# Patient Record
Sex: FEMALE | Race: WHITE | NOT HISPANIC OR LATINO | Employment: UNEMPLOYED | URBAN - METROPOLITAN AREA
[De-identification: names, ages, dates, MRNs, and addresses within clinical notes are randomized per-mention and may not be internally consistent; named-entity substitution may affect disease eponyms.]

---

## 2019-06-27 ENCOUNTER — HOSPITAL ENCOUNTER (OUTPATIENT)
Facility: MEDICAL CENTER | Age: 49
End: 2019-06-28
Attending: EMERGENCY MEDICINE | Admitting: INTERNAL MEDICINE
Payer: MEDICAID

## 2019-06-27 ENCOUNTER — APPOINTMENT (OUTPATIENT)
Dept: RADIOLOGY | Facility: MEDICAL CENTER | Age: 49
End: 2019-06-27
Attending: EMERGENCY MEDICINE
Payer: MEDICAID

## 2019-06-27 DIAGNOSIS — S81.812A LACERATION OF LEFT LOWER EXTREMITY, INITIAL ENCOUNTER: ICD-10-CM

## 2019-06-27 DIAGNOSIS — Z98.890 STATUS POST INCISION AND DRAINAGE: ICD-10-CM

## 2019-06-27 LAB
ALBUMIN SERPL BCP-MCNC: 4.1 G/DL (ref 3.2–4.9)
ALBUMIN/GLOB SERPL: 1.7 G/DL
ALP SERPL-CCNC: 105 U/L (ref 30–99)
ALT SERPL-CCNC: 11 U/L (ref 2–50)
ANION GAP SERPL CALC-SCNC: 10 MMOL/L (ref 0–11.9)
AST SERPL-CCNC: 13 U/L (ref 12–45)
BASOPHILS # BLD AUTO: 1.2 % (ref 0–1.8)
BASOPHILS # BLD: 0.08 K/UL (ref 0–0.12)
BILIRUB SERPL-MCNC: 0.5 MG/DL (ref 0.1–1.5)
BUN SERPL-MCNC: 17 MG/DL (ref 8–22)
CALCIUM SERPL-MCNC: 8.9 MG/DL (ref 8.5–10.5)
CHLORIDE SERPL-SCNC: 107 MMOL/L (ref 96–112)
CO2 SERPL-SCNC: 24 MMOL/L (ref 20–33)
CREAT SERPL-MCNC: 0.92 MG/DL (ref 0.5–1.4)
EOSINOPHIL # BLD AUTO: 0.13 K/UL (ref 0–0.51)
EOSINOPHIL NFR BLD: 1.9 % (ref 0–6.9)
ERYTHROCYTE [DISTWIDTH] IN BLOOD BY AUTOMATED COUNT: 44 FL (ref 35.9–50)
GLOBULIN SER CALC-MCNC: 2.4 G/DL (ref 1.9–3.5)
GLUCOSE SERPL-MCNC: 101 MG/DL (ref 65–99)
HCT VFR BLD AUTO: 35.2 % (ref 37–47)
HGB BLD-MCNC: 11.6 G/DL (ref 12–16)
IMM GRANULOCYTES # BLD AUTO: 0.01 K/UL (ref 0–0.11)
IMM GRANULOCYTES NFR BLD AUTO: 0.1 % (ref 0–0.9)
LYMPHOCYTES # BLD AUTO: 2.52 K/UL (ref 1–4.8)
LYMPHOCYTES NFR BLD: 37 % (ref 22–41)
MAGNESIUM SERPL-MCNC: 2.4 MG/DL (ref 1.5–2.5)
MCH RBC QN AUTO: 30.1 PG (ref 27–33)
MCHC RBC AUTO-ENTMCNC: 33 G/DL (ref 33.6–35)
MCV RBC AUTO: 91.2 FL (ref 81.4–97.8)
MONOCYTES # BLD AUTO: 0.46 K/UL (ref 0–0.85)
MONOCYTES NFR BLD AUTO: 6.7 % (ref 0–13.4)
NEUTROPHILS # BLD AUTO: 3.62 K/UL (ref 2–7.15)
NEUTROPHILS NFR BLD: 53.1 % (ref 44–72)
NRBC # BLD AUTO: 0 K/UL
NRBC BLD-RTO: 0 /100 WBC
PLATELET # BLD AUTO: 280 K/UL (ref 164–446)
PMV BLD AUTO: 10.4 FL (ref 9–12.9)
POTASSIUM SERPL-SCNC: 3.6 MMOL/L (ref 3.6–5.5)
PROT SERPL-MCNC: 6.5 G/DL (ref 6–8.2)
RBC # BLD AUTO: 3.86 M/UL (ref 4.2–5.4)
SODIUM SERPL-SCNC: 141 MMOL/L (ref 135–145)
WBC # BLD AUTO: 6.8 K/UL (ref 4.8–10.8)

## 2019-06-27 PROCEDURE — G0378 HOSPITAL OBSERVATION PER HR: HCPCS

## 2019-06-27 PROCEDURE — 85025 COMPLETE CBC W/AUTO DIFF WBC: CPT

## 2019-06-27 PROCEDURE — 36415 COLL VENOUS BLD VENIPUNCTURE: CPT

## 2019-06-27 PROCEDURE — 99285 EMERGENCY DEPT VISIT HI MDM: CPT

## 2019-06-27 PROCEDURE — 73590 X-RAY EXAM OF LOWER LEG: CPT | Mod: LT

## 2019-06-27 PROCEDURE — 304561 HCHG STAT O2

## 2019-06-27 PROCEDURE — 700105 HCHG RX REV CODE 258: Performed by: INTERNAL MEDICINE

## 2019-06-27 PROCEDURE — 700102 HCHG RX REV CODE 250 W/ 637 OVERRIDE(OP): Performed by: INTERNAL MEDICINE

## 2019-06-27 PROCEDURE — 99219 PR INITIAL OBSERVATION CARE,LEVL II: CPT | Performed by: INTERNAL MEDICINE

## 2019-06-27 PROCEDURE — 90471 IMMUNIZATION ADMIN: CPT

## 2019-06-27 PROCEDURE — 80053 COMPREHEN METABOLIC PANEL: CPT

## 2019-06-27 PROCEDURE — 83735 ASSAY OF MAGNESIUM: CPT

## 2019-06-27 PROCEDURE — 96367 TX/PROPH/DG ADDL SEQ IV INF: CPT

## 2019-06-27 PROCEDURE — 700111 HCHG RX REV CODE 636 W/ 250 OVERRIDE (IP): Performed by: INTERNAL MEDICINE

## 2019-06-27 PROCEDURE — A9270 NON-COVERED ITEM OR SERVICE: HCPCS | Performed by: INTERNAL MEDICINE

## 2019-06-27 PROCEDURE — 90715 TDAP VACCINE 7 YRS/> IM: CPT | Performed by: EMERGENCY MEDICINE

## 2019-06-27 PROCEDURE — 96365 THER/PROPH/DIAG IV INF INIT: CPT

## 2019-06-27 PROCEDURE — 700111 HCHG RX REV CODE 636 W/ 250 OVERRIDE (IP): Performed by: EMERGENCY MEDICINE

## 2019-06-27 PROCEDURE — 700105 HCHG RX REV CODE 258: Performed by: EMERGENCY MEDICINE

## 2019-06-27 RX ORDER — PROMETHAZINE HYDROCHLORIDE 25 MG/1
12.5-25 SUPPOSITORY RECTAL EVERY 4 HOURS PRN
Status: DISCONTINUED | OUTPATIENT
Start: 2019-06-27 | End: 2019-06-28

## 2019-06-27 RX ORDER — ACETAMINOPHEN 325 MG/1
650 TABLET ORAL EVERY 6 HOURS PRN
Status: DISCONTINUED | OUTPATIENT
Start: 2019-06-27 | End: 2019-06-29 | Stop reason: HOSPADM

## 2019-06-27 RX ORDER — OXYCODONE HYDROCHLORIDE 5 MG/1
5 TABLET ORAL EVERY 4 HOURS PRN
Status: DISCONTINUED | OUTPATIENT
Start: 2019-06-27 | End: 2019-06-29 | Stop reason: HOSPADM

## 2019-06-27 RX ORDER — MORPHINE SULFATE 4 MG/ML
2 INJECTION, SOLUTION INTRAMUSCULAR; INTRAVENOUS
Status: DISCONTINUED | OUTPATIENT
Start: 2019-06-27 | End: 2019-06-29 | Stop reason: HOSPADM

## 2019-06-27 RX ORDER — AMOXICILLIN 250 MG
2 CAPSULE ORAL 2 TIMES DAILY
Status: DISCONTINUED | OUTPATIENT
Start: 2019-06-27 | End: 2019-06-28

## 2019-06-27 RX ORDER — POLYETHYLENE GLYCOL 3350 17 G/17G
1 POWDER, FOR SOLUTION ORAL
Status: DISCONTINUED | OUTPATIENT
Start: 2019-06-27 | End: 2019-06-28

## 2019-06-27 RX ORDER — ONDANSETRON 4 MG/1
4 TABLET, ORALLY DISINTEGRATING ORAL EVERY 4 HOURS PRN
Status: DISCONTINUED | OUTPATIENT
Start: 2019-06-27 | End: 2019-06-29 | Stop reason: HOSPADM

## 2019-06-27 RX ORDER — CEFAZOLIN SODIUM 2 G/100ML
2 INJECTION, SOLUTION INTRAVENOUS EVERY 8 HOURS
Status: DISCONTINUED | OUTPATIENT
Start: 2019-06-27 | End: 2019-06-28

## 2019-06-27 RX ORDER — CEFAZOLIN SODIUM 1 G/50ML
1 INJECTION, SOLUTION INTRAVENOUS ONCE
Status: DISCONTINUED | OUTPATIENT
Start: 2019-06-27 | End: 2019-06-27

## 2019-06-27 RX ORDER — SODIUM CHLORIDE 9 MG/ML
INJECTION, SOLUTION INTRAVENOUS CONTINUOUS
Status: DISCONTINUED | OUTPATIENT
Start: 2019-06-27 | End: 2019-06-29 | Stop reason: HOSPADM

## 2019-06-27 RX ORDER — BISACODYL 10 MG
10 SUPPOSITORY, RECTAL RECTAL
Status: DISCONTINUED | OUTPATIENT
Start: 2019-06-27 | End: 2019-06-28

## 2019-06-27 RX ORDER — PROMETHAZINE HYDROCHLORIDE 25 MG/1
12.5-25 TABLET ORAL EVERY 4 HOURS PRN
Status: DISCONTINUED | OUTPATIENT
Start: 2019-06-27 | End: 2019-06-28

## 2019-06-27 RX ORDER — ONDANSETRON 2 MG/ML
4 INJECTION INTRAMUSCULAR; INTRAVENOUS EVERY 4 HOURS PRN
Status: DISCONTINUED | OUTPATIENT
Start: 2019-06-27 | End: 2019-06-29 | Stop reason: HOSPADM

## 2019-06-27 RX ADMIN — CEFAZOLIN SODIUM 2 G: 2 INJECTION, SOLUTION INTRAVENOUS at 21:34

## 2019-06-27 RX ADMIN — CLOSTRIDIUM TETANI TOXOID ANTIGEN (FORMALDEHYDE INACTIVATED), CORYNEBACTERIUM DIPHTHERIAE TOXOID ANTIGEN (FORMALDEHYDE INACTIVATED), BORDETELLA PERTUSSIS TOXOID ANTIGEN (GLUTARALDEHYDE INACTIVATED), BORDETELLA PERTUSSIS FILAMENTOUS HEMAGGLUTININ ANTIGEN (FORMALDEHYDE INACTIVATED), BORDETELLA PERTUSSIS PERTACTIN ANTIGEN, AND BORDETELLA PERTUSSIS FIMBRIAE 2/3 ANTIGEN 0.5 ML: 5; 2; 2.5; 5; 3; 5 INJECTION, SUSPENSION INTRAMUSCULAR at 15:52

## 2019-06-27 RX ADMIN — CEFTRIAXONE SODIUM 1 G: 1 INJECTION, POWDER, FOR SOLUTION INTRAMUSCULAR; INTRAVENOUS at 15:52

## 2019-06-27 RX ADMIN — SODIUM CHLORIDE: 9 INJECTION, SOLUTION INTRAVENOUS at 20:41

## 2019-06-27 RX ADMIN — OXYCODONE HYDROCHLORIDE 5 MG: 5 TABLET ORAL at 20:45

## 2019-06-27 ASSESSMENT — PATIENT HEALTH QUESTIONNAIRE - PHQ9
7. TROUBLE CONCENTRATING ON THINGS, SUCH AS READING THE NEWSPAPER OR WATCHING TELEVISION: NOT AT ALL
SUM OF ALL RESPONSES TO PHQ QUESTIONS 1-9: 1
3. TROUBLE FALLING OR STAYING ASLEEP OR SLEEPING TOO MUCH: NOT AT ALL
6. FEELING BAD ABOUT YOURSELF - OR THAT YOU ARE A FAILURE OR HAVE LET YOURSELF OR YOUR FAMILY DOWN: NOT AL ALL
9. THOUGHTS THAT YOU WOULD BE BETTER OFF DEAD, OR OF HURTING YOURSELF: NOT AT ALL
8. MOVING OR SPEAKING SO SLOWLY THAT OTHER PEOPLE COULD HAVE NOTICED. OR THE OPPOSITE, BEING SO FIGETY OR RESTLESS THAT YOU HAVE BEEN MOVING AROUND A LOT MORE THAN USUAL: NOT AT ALL
5. POOR APPETITE OR OVEREATING: NOT AT ALL
2. FEELING DOWN, DEPRESSED, IRRITABLE, OR HOPELESS: NOT AT ALL
4. FEELING TIRED OR HAVING LITTLE ENERGY: NOT AT ALL
SUM OF ALL RESPONSES TO PHQ9 QUESTIONS 1 AND 2: 1
1. LITTLE INTEREST OR PLEASURE IN DOING THINGS: SEVERAL DAYS

## 2019-06-27 ASSESSMENT — ENCOUNTER SYMPTOMS
NECK PAIN: 0
BACK PAIN: 0
LOSS OF CONSCIOUSNESS: 0

## 2019-06-27 ASSESSMENT — LIFESTYLE VARIABLES: ALCOHOL_USE: NO

## 2019-06-27 NOTE — H&P
Hospital Medicine History & Physical Note    Date of Service  6/27/2019    Primary Care Physician  Pcp Unobtainable By     Consultants  Orthopedic surgery Dr. Bañuelos    Code Status  Full    Chief Complaint  Left lower extremity injury    History of Presenting Illness    49 y.o. female with past medical history of psychiatric disorder not on any medication who presented to the hospital on 6/27/2019 with lower extremity injury.  She described injury that she fell through furniture and in result of the she developed left lower extremity injury.  She expressed that the hardware on the furniture caused avulsion to her left extremity.  She found to have bleeding at the site of injury and at the time of evaluation it was wrapped in dressing and there is a blood present around the wound.  She denies any acute complaints of chest pain, nausea, vomiting and any other injury.  She denies hitting her head.  She does not take any medication on a regular basis.  She reported that she used to do drugs and she does not do drugs anymore.    I discussed about this admission with ED physician Dr. Castañeda    Review of Systems  Review of Systems   Constitutional: Negative for chills, fever and weight loss.   HENT: Negative for hearing loss and tinnitus.    Eyes: Negative for blurred vision, double vision, photophobia and pain.   Respiratory: Negative for cough, sputum production and shortness of breath.    Cardiovascular: Negative for chest pain, palpitations, orthopnea and leg swelling.   Gastrointestinal: Negative for abdominal pain, constipation, diarrhea, nausea and vomiting.   Genitourinary: Negative for dysuria, frequency and urgency.   Musculoskeletal: Positive for falls. Negative for back pain, joint pain, myalgias and neck pain.   Skin:        Wound on left lower extremity   Neurological: Negative for dizziness, tingling, tremors, sensory change, speech change, focal weakness and headaches.   Psychiatric/Behavioral:  Negative for hallucinations and substance abuse.   All other systems reviewed and are negative.      Past Medical History   has a past medical history of Drug abuse and Psychiatric disorder.    Surgical History   has a past surgical history that includes hysterectomy, total abdominal and pelviscopy (4/23/08).     Family History  I reviewed with patient.  Mom has COPD   Denies any other medical problem in family.  Social History   reports that she has been smoking.  She does not have any smokeless tobacco history on file. She reports that she drinks alcohol. She reports that she uses drugs.    Allergies  Allergies   Allergen Reactions   • Penicillins        Medications  Prior to Admission Medications   Prescriptions Last Dose Informant Patient Reported? Taking?   cetirizine-psuedoephedrine (ZYRTEC-D) 5-120 MG per tablet   No No   Sig: Take 1 Tab by mouth 2 times a day.      Facility-Administered Medications: None       Physical Exam  Temp:  [36.7 °C (98 °F)] 36.7 °C (98 °F)  Pulse:  [82] 82  Resp:  [18] 18  BP: (115)/(72) 115/72  SpO2:  [100 %] 100 %    Physical Exam   Constitutional: She is oriented to person, place, and time.   She is laying in bed without any acute distress   HENT:   Head: Normocephalic and atraumatic.   Eyes: Pupils are equal, round, and reactive to light. Right eye exhibits no discharge. Left eye exhibits no discharge.   Neck: Normal range of motion. Neck supple.   Cardiovascular: Normal rate, regular rhythm and normal heart sounds.  Exam reveals no friction rub.    No murmur heard.  Pulmonary/Chest: Effort normal and breath sounds normal. No respiratory distress. She has no wheezes. She has no rales.   Abdominal: Soft. Bowel sounds are normal. She exhibits no distension. There is no tenderness. There is no rebound.   Musculoskeletal: Normal range of motion. She exhibits no edema or tenderness.   She has dressing present and her wound is wrapped.  Currently dressing CDI.   Neurological: She is  alert and oriented to person, place, and time. No cranial nerve deficit.   Skin: Skin is warm and dry. No rash noted. She is not diaphoretic. No erythema.   Psychiatric: She has a normal mood and affect. Her behavior is normal.       Laboratory:  Recent Labs      06/27/19   1528   WBC  6.8   RBC  3.86*   HEMOGLOBIN  11.6*   HEMATOCRIT  35.2*   MCV  91.2   MCH  30.1   MCHC  33.0*   RDW  44.0   PLATELETCT  280   MPV  10.4     Recent Labs      06/27/19   1528   SODIUM  141   POTASSIUM  3.6   CHLORIDE  107   CO2  24   GLUCOSE  101*   BUN  17   CREATININE  0.92   CALCIUM  8.9     Recent Labs      06/27/19   1528   ALTSGPT  11   ASTSGOT  13   ALKPHOSPHAT  105*   TBILIRUBIN  0.5   GLUCOSE  101*                 No results for input(s): TROPONINI in the last 72 hours.    Urinalysis:    No results found     Imaging:  DX-TIBIA AND FIBULA LEFT   Final Result      Bilateral lower extremity soft tissue defect without underlying acute osseous abnormality. No evidence of retained radiopaque foreign body.            Assessment/Plan:  I anticipate this patient is appropriate for observation status at this time.    Laceration of left lower extremity   Assessment & Plan    She found to have left leg laceration and results of trauma.  Orthopedic surgery was consulted and Dr. Bañuelos evaluated her and recommended surgical management.  Currently patient is n.p.o. for possibility of the surgery.  Continue to provide her pain control with p.o. oxycodone and IV morphine.  Monitor for adverse effect of pain medications.  I discussed plan of care with patient.     Anemia   Assessment & Plan    She found to have normocytic anemia.  She has low hemoglobin past.  Continue to monitor         VTE prophylaxis: SCDs as patient is going to have surgery

## 2019-06-27 NOTE — ED TRIAGE NOTES
Chief Complaint   Patient presents with   • Leg Injury     BIB EMS for LLE 4 x 5 inch avulsion, per EMS pt fell through furniture she was standing on while moving.

## 2019-06-27 NOTE — ED PROVIDER NOTES
ED Provider Note    Scribed for Abad Castañeda M.D. by Yang Sheets. 6/27/2019, 3:17 PM.    Primary care provider: None noted  Means of arrival: EMS  History obtained from: EMS, patient  History limited by: None    CHIEF COMPLAINT  Chief Complaint   Patient presents with   • Leg Injury     BIB EMS for LLE 4 x 5 inch avulsion, per EMS pt fell through furniture she was standing on while moving.       HPI  Pippa Mcintosh is a 49 y.o. female who presents to the Emergency Department via EMS for evaluation of a left lower extremity injury sustained earlier today. Patient states she fell through furniture that she was standing on while moving. She states the drop was about 2.5 ft. Patient believes the hardware on the furniture cut her, resulting in an avulsion to her left leg. Patient had associated bleeding from the area. She denies any neck pain, back pain, or loss of consciousness. She notes that she has a history of non-insulin dependent diabetes.    REVIEW OF SYSTEMS  Review of Systems   Musculoskeletal: Negative for back pain and neck pain.   Skin:        Positive left lower extremity avulsion with bleeding   Neurological: Negative for loss of consciousness.   All other systems reviewed and are negative.    PAST MEDICAL HISTORY   has a past medical history of Drug abuse and Psychiatric disorder.    SURGICAL HISTORY   has a past surgical history that includes hysterectomy, total abdominal and pelviscopy (4/23/08).    SOCIAL HISTORY  Social History   Substance Use Topics   • Smoking status: Current Every Day Smoker   • Smokeless tobacco: Not on file      Comment: 1/2 ppd   • Alcohol use Yes      Comment: ONCE A MONTH      History   Drug Use     Comment: meth, MONTHLY       FAMILY HISTORY  No family history on file.    CURRENT MEDICATIONS  Home Medications     Reviewed by Korey Hernandez R.N. (Registered Nurse) on 06/27/19 at 1520  Med List Status: Partial   Medication Last Dose Status  "  cetirizine-psuedoephedrine (ZYRTEC-D) 5-120 MG per tablet  Active                ALLERGIES  Allergies   Allergen Reactions   • Penicillins        PHYSICAL EXAM  VITAL SIGNS: /72   Pulse 82   Temp 36.7 °C (98 °F) (Temporal)   Resp 18   Ht 1.676 m (5' 6\")   Wt 70.3 kg (155 lb)   SpO2 100%   BMI 25.02 kg/m²   Vitals reviewed.  Constitutional : Awake alert anxious tearful.  HENT: Normocephalic, Atraumatic, Bilateral external ears normal, Oropharynx moist, No oral exudates, Nose normal.   Eyes: PERRL, EOMI, Conjunctiva normal, No discharge.   Neck: Normal range of motion, No tenderness,   Cardiovascular: Normal heart rate, Normal rhythm, No murmurs, No rubs, No gallops.   Thorax & Lungs: Normal breath sounds, No respiratory distress, No wheezing,  Abdomen: Bowel sounds normal, Soft, No tenderness  Back: No tenderness, No CVA tenderness.   Musculoskeletal: Good range of motion in all major joints.  Small puncture in the anterior lateral aspect of the right shin.    Large laceration on the lateral and anterior aspect of the mid left shin.  This is approximately 5 cm x 5 cm in V-shaped.  It goes down to the subcutaneous tissue and down to the muscle but not to the bone.  Normal distal neurovascular examination.  Neurologic: Alert, No focal deficits noted.   Psychiatric: Anxious affect.    LABS  Results for orders placed or performed during the hospital encounter of 06/27/19   CBC WITH DIFFERENTIAL   Result Value Ref Range    WBC 6.8 4.8 - 10.8 K/uL    RBC 3.86 (L) 4.20 - 5.40 M/uL    Hemoglobin 11.6 (L) 12.0 - 16.0 g/dL    Hematocrit 35.2 (L) 37.0 - 47.0 %    MCV 91.2 81.4 - 97.8 fL    MCH 30.1 27.0 - 33.0 pg    MCHC 33.0 (L) 33.6 - 35.0 g/dL    RDW 44.0 35.9 - 50.0 fL    Platelet Count 280 164 - 446 K/uL    MPV 10.4 9.0 - 12.9 fL    Neutrophils-Polys 53.10 44.00 - 72.00 %    Lymphocytes 37.00 22.00 - 41.00 %    Monocytes 6.70 0.00 - 13.40 %    Eosinophils 1.90 0.00 - 6.90 %    Basophils 1.20 0.00 - 1.80 %    " Immature Granulocytes 0.10 0.00 - 0.90 %    Nucleated RBC 0.00 /100 WBC    Neutrophils (Absolute) 3.62 2.00 - 7.15 K/uL    Lymphs (Absolute) 2.52 1.00 - 4.80 K/uL    Monos (Absolute) 0.46 0.00 - 0.85 K/uL    Eos (Absolute) 0.13 0.00 - 0.51 K/uL    Baso (Absolute) 0.08 0.00 - 0.12 K/uL    Immature Granulocytes (abs) 0.01 0.00 - 0.11 K/uL    NRBC (Absolute) 0.00 K/uL   COMP METABOLIC PANEL   Result Value Ref Range    Sodium 141 135 - 145 mmol/L    Potassium 3.6 3.6 - 5.5 mmol/L    Chloride 107 96 - 112 mmol/L    Co2 24 20 - 33 mmol/L    Anion Gap 10.0 0.0 - 11.9    Glucose 101 (H) 65 - 99 mg/dL    Bun 17 8 - 22 mg/dL    Creatinine 0.92 0.50 - 1.40 mg/dL    Calcium 8.9 8.5 - 10.5 mg/dL    AST(SGOT) 13 12 - 45 U/L    ALT(SGPT) 11 2 - 50 U/L    Alkaline Phosphatase 105 (H) 30 - 99 U/L    Total Bilirubin 0.5 0.1 - 1.5 mg/dL    Albumin 4.1 3.2 - 4.9 g/dL    Total Protein 6.5 6.0 - 8.2 g/dL    Globulin 2.4 1.9 - 3.5 g/dL    A-G Ratio 1.7 g/dL   ESTIMATED GFR   Result Value Ref Range    GFR If African American >60 >60 mL/min/1.73 m 2    GFR If Non African American >60 >60 mL/min/1.73 m 2      All labs reviewed by me.    RADIOLOGY  DX-TIBIA AND FIBULA LEFT   Final Result      Bilateral lower extremity soft tissue defect without underlying acute osseous abnormality. No evidence of retained radiopaque foreign body.        The radiologist's interpretation of all radiological studies have been reviewed by me.    COURSE & MEDICAL DECISION MAKING  Pertinent Labs & Imaging studies reviewed. (See chart for details)        3:17 PM Patient seen and examined at bedside. The patient presents with a left leg avulsion, and the differential diagnosis includes but is not limited to laceration, laceration with subcutaneous tissue damage, fracture.  Open fracture ordered for DX tibia and fibul left, estimated GFR, CBC with differential, CMP to evaluate. Patient will be treated with adacel inj.    3:25 PM Paged ortho    3:33 PM Ordered rocephin  1 g in  ml ivpb    3:42 PM I discussed the patient's case and the above findings with Dr. Bañuelos (ortho) who evaluate the patient.     Orthopedics requesting that the patient left the hospitalist and he will clean and closed the wound tonight.        FINAL IMPRESSION  1. Laceration of left lower extremity, initial encounter          IYang (Scribe), am scribing for, and in the presence of, Abad Castañeda M.D..    Electronically signed by: Yang Sheets (Scribe), 6/27/2019    IAbad M.D. personally performed the services described in this documentation, as scribed by Yang Sheets in my presence, and it is both accurate and complete. C    The note accurately reflects work and decisions made by me.  Abad Castañeda  6/27/2019  5:40 PM

## 2019-06-28 ENCOUNTER — ANESTHESIA (OUTPATIENT)
Dept: SURGERY | Facility: MEDICAL CENTER | Age: 49
End: 2019-06-28
Payer: MEDICAID

## 2019-06-28 ENCOUNTER — ANESTHESIA EVENT (OUTPATIENT)
Dept: SURGERY | Facility: MEDICAL CENTER | Age: 49
End: 2019-06-28
Payer: MEDICAID

## 2019-06-28 ENCOUNTER — PATIENT OUTREACH (OUTPATIENT)
Dept: HEALTH INFORMATION MANAGEMENT | Facility: OTHER | Age: 49
End: 2019-06-28

## 2019-06-28 VITALS
HEART RATE: 77 BPM | DIASTOLIC BLOOD PRESSURE: 56 MMHG | SYSTOLIC BLOOD PRESSURE: 99 MMHG | OXYGEN SATURATION: 99 % | WEIGHT: 163.8 LBS | TEMPERATURE: 99.2 F | RESPIRATION RATE: 18 BRPM | BODY MASS INDEX: 26.33 KG/M2 | HEIGHT: 66 IN

## 2019-06-28 PROBLEM — D64.9 ANEMIA: Status: ACTIVE | Noted: 2019-06-28

## 2019-06-28 PROBLEM — Z98.890 STATUS POST INCISION AND DRAINAGE: Status: ACTIVE | Noted: 2019-06-28

## 2019-06-28 PROBLEM — S81.812A LACERATION OF LEFT LOWER EXTREMITY: Status: ACTIVE | Noted: 2019-06-28

## 2019-06-28 PROCEDURE — 160048 HCHG OR STATISTICAL LEVEL 1-5: Performed by: ORTHOPAEDIC SURGERY

## 2019-06-28 PROCEDURE — 501445 HCHG STAPLER, SKIN DISP: Performed by: ORTHOPAEDIC SURGERY

## 2019-06-28 PROCEDURE — 160036 HCHG PACU - EA ADDL 30 MINS PHASE I: Performed by: ORTHOPAEDIC SURGERY

## 2019-06-28 PROCEDURE — A9270 NON-COVERED ITEM OR SERVICE: HCPCS | Performed by: ANESTHESIOLOGY

## 2019-06-28 PROCEDURE — 700105 HCHG RX REV CODE 258: Performed by: ANESTHESIOLOGY

## 2019-06-28 PROCEDURE — 160035 HCHG PACU - 1ST 60 MINS PHASE I: Performed by: ORTHOPAEDIC SURGERY

## 2019-06-28 PROCEDURE — 700102 HCHG RX REV CODE 250 W/ 637 OVERRIDE(OP): Performed by: ANESTHESIOLOGY

## 2019-06-28 PROCEDURE — 700111 HCHG RX REV CODE 636 W/ 250 OVERRIDE (IP): Performed by: ANESTHESIOLOGY

## 2019-06-28 PROCEDURE — 501330 HCHG SET, CYSTO IRRIG TUBING: Performed by: ORTHOPAEDIC SURGERY

## 2019-06-28 PROCEDURE — 160038 HCHG SURGERY MINUTES - EA ADDL 1 MIN LEVEL 2: Performed by: ORTHOPAEDIC SURGERY

## 2019-06-28 PROCEDURE — 160002 HCHG RECOVERY MINUTES (STAT): Performed by: ORTHOPAEDIC SURGERY

## 2019-06-28 PROCEDURE — 160009 HCHG ANES TIME/MIN: Performed by: ORTHOPAEDIC SURGERY

## 2019-06-28 PROCEDURE — 700101 HCHG RX REV CODE 250: Performed by: ANESTHESIOLOGY

## 2019-06-28 PROCEDURE — G0378 HOSPITAL OBSERVATION PER HR: HCPCS

## 2019-06-28 PROCEDURE — 501838 HCHG SUTURE GENERAL: Performed by: ORTHOPAEDIC SURGERY

## 2019-06-28 PROCEDURE — 99217 PR OBSERVATION CARE DISCHARGE: CPT | Performed by: INTERNAL MEDICINE

## 2019-06-28 PROCEDURE — 500881 HCHG PACK, EXTREMITY: Performed by: ORTHOPAEDIC SURGERY

## 2019-06-28 PROCEDURE — 700105 HCHG RX REV CODE 258: Performed by: INTERNAL MEDICINE

## 2019-06-28 PROCEDURE — 700111 HCHG RX REV CODE 636 W/ 250 OVERRIDE (IP): Performed by: ORTHOPAEDIC SURGERY

## 2019-06-28 PROCEDURE — 96366 THER/PROPH/DIAG IV INF ADDON: CPT | Mod: XU

## 2019-06-28 PROCEDURE — 160027 HCHG SURGERY MINUTES - 1ST 30 MINS LEVEL 2: Performed by: ORTHOPAEDIC SURGERY

## 2019-06-28 PROCEDURE — A6454 SELF-ADHER BAND W>=3" <5"/YD: HCPCS | Performed by: ORTHOPAEDIC SURGERY

## 2019-06-28 PROCEDURE — 700102 HCHG RX REV CODE 250 W/ 637 OVERRIDE(OP): Performed by: INTERNAL MEDICINE

## 2019-06-28 PROCEDURE — A9270 NON-COVERED ITEM OR SERVICE: HCPCS | Performed by: INTERNAL MEDICINE

## 2019-06-28 RX ORDER — DIPHENHYDRAMINE HYDROCHLORIDE 50 MG/ML
12.5 INJECTION INTRAMUSCULAR; INTRAVENOUS
Status: DISCONTINUED | OUTPATIENT
Start: 2019-06-28 | End: 2019-06-28 | Stop reason: HOSPADM

## 2019-06-28 RX ORDER — HYDROMORPHONE HYDROCHLORIDE 1 MG/ML
0.1 INJECTION, SOLUTION INTRAMUSCULAR; INTRAVENOUS; SUBCUTANEOUS
Status: DISCONTINUED | OUTPATIENT
Start: 2019-06-28 | End: 2019-06-28 | Stop reason: HOSPADM

## 2019-06-28 RX ORDER — KETOROLAC TROMETHAMINE 30 MG/ML
INJECTION, SOLUTION INTRAMUSCULAR; INTRAVENOUS PRN
Status: DISCONTINUED | OUTPATIENT
Start: 2019-06-28 | End: 2019-06-28 | Stop reason: SURG

## 2019-06-28 RX ORDER — OXYCODONE HYDROCHLORIDE 5 MG/1
5-10 TABLET ORAL EVERY 4 HOURS PRN
Qty: 20 TAB | Refills: 0 | Status: SHIPPED | OUTPATIENT
Start: 2019-06-28 | End: 2019-07-03

## 2019-06-28 RX ORDER — ONDANSETRON 2 MG/ML
4 INJECTION INTRAMUSCULAR; INTRAVENOUS
Status: DISCONTINUED | OUTPATIENT
Start: 2019-06-28 | End: 2019-06-28 | Stop reason: HOSPADM

## 2019-06-28 RX ORDER — DEXAMETHASONE SODIUM PHOSPHATE 4 MG/ML
INJECTION, SOLUTION INTRA-ARTICULAR; INTRALESIONAL; INTRAMUSCULAR; INTRAVENOUS; SOFT TISSUE PRN
Status: DISCONTINUED | OUTPATIENT
Start: 2019-06-28 | End: 2019-06-28 | Stop reason: SURG

## 2019-06-28 RX ORDER — MEPERIDINE HYDROCHLORIDE 25 MG/ML
6.25 INJECTION INTRAMUSCULAR; INTRAVENOUS; SUBCUTANEOUS
Status: DISCONTINUED | OUTPATIENT
Start: 2019-06-28 | End: 2019-06-28 | Stop reason: HOSPADM

## 2019-06-28 RX ORDER — HYDROMORPHONE HYDROCHLORIDE 1 MG/ML
0.2 INJECTION, SOLUTION INTRAMUSCULAR; INTRAVENOUS; SUBCUTANEOUS
Status: DISCONTINUED | OUTPATIENT
Start: 2019-06-28 | End: 2019-06-28 | Stop reason: HOSPADM

## 2019-06-28 RX ORDER — CEFAZOLIN SODIUM 2 G/100ML
2 INJECTION, SOLUTION INTRAVENOUS EVERY 8 HOURS
Status: COMPLETED | OUTPATIENT
Start: 2019-06-28 | End: 2019-06-28

## 2019-06-28 RX ORDER — OXYCODONE HYDROCHLORIDE AND ACETAMINOPHEN 5; 325 MG/1; MG/1
2 TABLET ORAL
Status: COMPLETED | OUTPATIENT
Start: 2019-06-28 | End: 2019-06-28

## 2019-06-28 RX ORDER — OXYCODONE HYDROCHLORIDE AND ACETAMINOPHEN 5; 325 MG/1; MG/1
1 TABLET ORAL
Status: COMPLETED | OUTPATIENT
Start: 2019-06-28 | End: 2019-06-28

## 2019-06-28 RX ORDER — HALOPERIDOL 5 MG/ML
1 INJECTION INTRAMUSCULAR
Status: DISCONTINUED | OUTPATIENT
Start: 2019-06-28 | End: 2019-06-28 | Stop reason: HOSPADM

## 2019-06-28 RX ORDER — SODIUM CHLORIDE, SODIUM LACTATE, POTASSIUM CHLORIDE, CALCIUM CHLORIDE 600; 310; 30; 20 MG/100ML; MG/100ML; MG/100ML; MG/100ML
INJECTION, SOLUTION INTRAVENOUS
Status: DISCONTINUED | OUTPATIENT
Start: 2019-06-28 | End: 2019-06-28 | Stop reason: SURG

## 2019-06-28 RX ORDER — SODIUM CHLORIDE, SODIUM LACTATE, POTASSIUM CHLORIDE, CALCIUM CHLORIDE 600; 310; 30; 20 MG/100ML; MG/100ML; MG/100ML; MG/100ML
INJECTION, SOLUTION INTRAVENOUS CONTINUOUS
Status: DISCONTINUED | OUTPATIENT
Start: 2019-06-28 | End: 2019-06-28 | Stop reason: HOSPADM

## 2019-06-28 RX ORDER — HYDROMORPHONE HYDROCHLORIDE 1 MG/ML
0.4 INJECTION, SOLUTION INTRAMUSCULAR; INTRAVENOUS; SUBCUTANEOUS
Status: DISCONTINUED | OUTPATIENT
Start: 2019-06-28 | End: 2019-06-28 | Stop reason: HOSPADM

## 2019-06-28 RX ORDER — ONDANSETRON 2 MG/ML
INJECTION INTRAMUSCULAR; INTRAVENOUS PRN
Status: DISCONTINUED | OUTPATIENT
Start: 2019-06-28 | End: 2019-06-28 | Stop reason: SURG

## 2019-06-28 RX ORDER — CEFAZOLIN SODIUM 1 G/3ML
INJECTION, POWDER, FOR SOLUTION INTRAMUSCULAR; INTRAVENOUS PRN
Status: DISCONTINUED | OUTPATIENT
Start: 2019-06-28 | End: 2019-06-28 | Stop reason: SURG

## 2019-06-28 RX ADMIN — CEFAZOLIN 2 G: 330 INJECTION, POWDER, FOR SOLUTION INTRAMUSCULAR; INTRAVENOUS at 03:26

## 2019-06-28 RX ADMIN — SODIUM CHLORIDE: 9 INJECTION, SOLUTION INTRAVENOUS at 11:49

## 2019-06-28 RX ADMIN — CEFAZOLIN SODIUM 2 G: 2 INJECTION, SOLUTION INTRAVENOUS at 20:55

## 2019-06-28 RX ADMIN — ONDANSETRON 4 MG: 2 INJECTION INTRAMUSCULAR; INTRAVENOUS at 03:29

## 2019-06-28 RX ADMIN — FENTANYL CITRATE 100 MCG: 50 INJECTION, SOLUTION INTRAMUSCULAR; INTRAVENOUS at 03:25

## 2019-06-28 RX ADMIN — PROPOFOL 150 MG: 10 INJECTION, EMULSION INTRAVENOUS at 03:25

## 2019-06-28 RX ADMIN — KETOROLAC TROMETHAMINE 30 MG: 30 INJECTION, SOLUTION INTRAMUSCULAR at 03:35

## 2019-06-28 RX ADMIN — SUGAMMADEX 200 MG: 100 INJECTION, SOLUTION INTRAVENOUS at 03:35

## 2019-06-28 RX ADMIN — ROCURONIUM BROMIDE 50 MG: 10 INJECTION, SOLUTION INTRAVENOUS at 03:25

## 2019-06-28 RX ADMIN — FENTANYL CITRATE 50 MCG: 50 INJECTION INTRAMUSCULAR; INTRAVENOUS at 04:52

## 2019-06-28 RX ADMIN — OXYCODONE HYDROCHLORIDE 5 MG: 5 TABLET ORAL at 16:32

## 2019-06-28 RX ADMIN — OXYCODONE HYDROCHLORIDE AND ACETAMINOPHEN 2 TABLET: 5; 325 TABLET ORAL at 04:20

## 2019-06-28 RX ADMIN — CEFAZOLIN SODIUM 2 G: 2 INJECTION, SOLUTION INTRAVENOUS at 11:50

## 2019-06-28 RX ADMIN — MIDAZOLAM HYDROCHLORIDE 2 MG: 1 INJECTION, SOLUTION INTRAMUSCULAR; INTRAVENOUS at 03:18

## 2019-06-28 RX ADMIN — SODIUM CHLORIDE, POTASSIUM CHLORIDE, SODIUM LACTATE AND CALCIUM CHLORIDE: 600; 310; 30; 20 INJECTION, SOLUTION INTRAVENOUS at 03:09

## 2019-06-28 RX ADMIN — DEXAMETHASONE SODIUM PHOSPHATE 8 MG: 4 INJECTION, SOLUTION INTRA-ARTICULAR; INTRALESIONAL; INTRAMUSCULAR; INTRAVENOUS; SOFT TISSUE at 03:29

## 2019-06-28 ASSESSMENT — ENCOUNTER SYMPTOMS
HEADACHES: 0
PHOTOPHOBIA: 0
SPEECH CHANGE: 0
SHORTNESS OF BREATH: 0
FALLS: 0
SPUTUM PRODUCTION: 0
SEIZURES: 0
BACK PAIN: 0
SENSORY CHANGE: 0
DIARRHEA: 0
FALLS: 1
VOMITING: 0
COUGH: 0
NERVOUS/ANXIOUS: 0
NAUSEA: 0
ABDOMINAL PAIN: 0
TINGLING: 0
DIZZINESS: 0
FEVER: 0
EYES NEGATIVE: 1
MYALGIAS: 0
CHILLS: 0
PALPITATIONS: 0
HALLUCINATIONS: 0
WEIGHT LOSS: 0
NECK PAIN: 0
DEPRESSION: 0
TREMORS: 0
BLURRED VISION: 0
MEMORY LOSS: 0
WHEEZING: 0
ORTHOPNEA: 0
DOUBLE VISION: 0
EYE PAIN: 0
GASTROINTESTINAL NEGATIVE: 1
FOCAL WEAKNESS: 0
CONSTIPATION: 0

## 2019-06-28 ASSESSMENT — LIFESTYLE VARIABLES
EVER_SMOKED: YES
SUBSTANCE_ABUSE: 0

## 2019-06-28 ASSESSMENT — PATIENT HEALTH QUESTIONNAIRE - PHQ9
SUM OF ALL RESPONSES TO PHQ9 QUESTIONS 1 AND 2: 0
1. LITTLE INTEREST OR PLEASURE IN DOING THINGS: NOT AT ALL

## 2019-06-28 ASSESSMENT — PAIN SCALES - GENERAL: PAIN_LEVEL: 2

## 2019-06-28 NOTE — ANESTHESIA PROCEDURE NOTES
Airway  Date/Time: 6/28/2019 3:25 AM  Performed by: CARINA CRISTINA  Authorized by: CARINA CRITSINA     Location:  OR  Urgency:  Elective  Difficult Airway: No    Indications for Airway Management:  Anesthesia  Spontaneous Ventilation: absent    Sedation Level:  Deep  Preoxygenated: Yes    Patient Position:  Sniffing  Mask Difficulty Assessment:  2 - vent by mask + OA or adjuvant +/- NMBA  Final Airway Type:  Endotracheal airway  Final Endotracheal Airway:  ETT  Cuffed: Yes    Technique Used for Successful ETT Placement:  Direct laryngoscopy  Insertion Site:  Oral  Blade Type:  Kyaw  Laryngoscope Blade/Videolaryngoscope Blade Size:  3  ETT Size (mm):  6.5  Measured from:  Teeth  ETT to Teeth (cm):  21  Placement Verified by: auscultation and capnometry    Cormack-Lehane Classification:  Grade IIa - partial view of glottis  Number of Attempts at Approach:  1   atraumatic Dlx1

## 2019-06-28 NOTE — ASSESSMENT & PLAN NOTE
-after falling through a piece of furniture  -she has received a Tetanus shot  -s/p I&D today - dressing is CDI with intact CMS  -continue IV ABX x2 more doses  -pain management

## 2019-06-28 NOTE — PROGRESS NOTES
Patient transported to Surgery via bed. Pt toileted/voided, changed, CHG bath given, jewelries removed. VS checked. BP:103/58, DE:75, Temp:97.6, RR:18, SpO2:98%.

## 2019-06-28 NOTE — PROGRESS NOTES
Assessment completed.  Pt A&Ox4, easily falls asleep during middle of conversations but easily arousable.  Respirations even, unlabored on 2L oxygen.  Pt denies any pain at this time. IVF infusing per MAR.  POC discussed; communication board updated.    Bed in locked, lowest position.  Call light and belongings within reach.  Needs met, will continue to monitor

## 2019-06-28 NOTE — ANESTHESIA PREPROCEDURE EVALUATION
Large left leg laceration    Relevant Problems   (+) Anemia   (+) Laceration of left lower extremity   history of drug abuse-meth  +tob  asthma    Physical Exam    Airway   Mallampati: II  TM distance: >3 FB  Neck ROM: full       Cardiovascular - normal exam  Rhythm: regular  Rate: normal  (-) murmur     Dental - normal exam         Pulmonary - normal exam  Breath sounds clear to auscultation     Abdominal    Neurological - normal exam                 Anesthesia Plan    ASA 2 - emergent (open leg laceration)       Plan - general       Airway plan will be ETT        Induction: intravenous    Postoperative Plan: Postoperative administration of opioids is intended.    Pertinent diagnostic labs and testing reviewed    Informed Consent:    Anesthetic plan and risks discussed with patient.

## 2019-06-28 NOTE — DISCHARGE SUMMARY
Discharge Summary    CHIEF COMPLAINT ON ADMISSION  Chief Complaint   Patient presents with   • Leg Injury     BIB EMS for LLE 4 x 5 inch avulsion, per EMS pt fell through furniture she was standing on while moving.       Reason for Admission  EMS     Admission Date  6/27/2019    Discharge Date  06/28/19    CODE STATUS  Full Code    HPI & HOSPITAL COURSE  This is a 49-year-old female with PMH significant for psychiatric disorder not currently on medication who presented 6/27/2019 with LLE injury after falling through a piece of furniture.  She reports hardware on the furniture causing an avulsion to her LLE.  Imaging showed no abnormalities.  She underwent I&D on 6/28/2019.   She was seen and examined prior to discharge. She is alert and oriented and reports adequate pain control with oxycodone. Her LLE dressing is CDI with intact CMS. She is instructed to WBAT, continue compressive dressing and FU with Orthopedics in 2 weeks for wound check and staple removal. She is agreeable to the plan of care and eager for DC home this evening.     Therefore, she is discharged in good and stable condition to home with close outpatient follow-up.    FOLLOW UP ITEMS POST DISCHARGE  -As discussed above.    DISCHARGE DIAGNOSES  Active Problems:    Laceration of left lower extremity POA: Yes    Anemia POA: Yes    Status post incision and drainage POA: No  Resolved Problems:    * No resolved hospital problems. *      FOLLOW UP  No future appointments.  [address] [address] [phone]    Call to establish with a primary care provider. This office offers discounts to patients who do not have insurance.       MEDICATIONS ON DISCHARGE     Medication List      START taking these medications      Instructions   oxyCODONE immediate-release 5 MG Tabs  Commonly known as:  ROXICODONE   Take 1-2 Tabs by mouth every four hours as needed for Severe Pain for up to 5 days.  Dose:  5-10 mg             Allergies  Allergies   Allergen Reactions   • Penicillins      Swelling from Hands up to Face.       DIET  Orders Placed This Encounter   Procedures   • Diet Order Regular     Standing Status:   Standing     Number of Occurrences:   1     Order Specific Question:   Diet:     Answer:   Regular [1]       ACTIVITY  As tolerated.  Weight bearing as tolerated    CONSULTATIONS  Orthopedics    PROCEDURES  I&D LLE    LABORATORY  Lab Results   Component Value Date    SODIUM 141 06/27/2019    POTASSIUM 3.6 06/27/2019    CHLORIDE 107 06/27/2019    CO2 24 06/27/2019    GLUCOSE 101 (H) 06/27/2019    BUN 17 06/27/2019    CREATININE 0.92 06/27/2019        Lab Results   Component Value Date    WBC 6.8 06/27/2019    HEMOGLOBIN 11.6 (L) 06/27/2019    HEMATOCRIT 35.2 (L) 06/27/2019    PLATELETCT 280 06/27/2019        KELLIE Ford.

## 2019-06-28 NOTE — ANESTHESIA QCDR
2019 [facility] Clinical Data Registry (for Quality Improvement)     Postoperative nausea/vomiting risk protocol (Adult = 18 yrs and Pediatric 3-17 yrs)- (430 and 463)  General inhalation anesthetic (NOT TIVA) with PONV risk factors: Yes  Provision of anti-emetic therapy with at least 2 different classes of agents: Yes   Patient DID NOT receive anti-emetic therapy and reason is documented in Medical Record:  N/A    Multimodal Pain Management- (AQI59)  Patient undergoing Elective Surgery (i.e. Outpatient, or ASC, or Prescheduled Surgery prior to Hospital Admission): No  Use of Multimodal Pain Management, two or more drugs and/or interventions, NOT including systemic opioids: N/A  Exception: Documented allergy to multiple classes of analgesics: N/A    PACU assessment of acute postoperative pain prior to Anesthesia Care End- Applies to Patients Age = 18- (ABG7)  Initial PACU pain score is which of the following: < 7/10  Patient unable to report pain score: N/A    Post-anesthetic transfer of care checklist/protocol to PACU/ICU- (426 and 427)  Upon conclusion of case, patient transferred to which of the following locations: PACU/Non-ICU  Use of transfer checklist/protocol: Yes  Exclusion: Service Performed in Patient Hospital Room (and thus did not require transfer): N/A    PACU Reintubation- (AQI31)  General anesthesia requiring endotracheal intubation (ETT) along with subsequent extubation in OR or PACU: Yes  Required reintubation in the PACU: No   Extubation was a planned trial documented in the medical record prior to removal of the original airway device:  N/A    Unplanned admission to ICU related to anesthesia service up through end of PACU care- (MD51)  Unplanned admission to ICU (not initially anticipated at anesthesia start time): No

## 2019-06-28 NOTE — PROGRESS NOTES
I have personally seen and examined / evaluated the patient, discussed the patient's evaluation & management plan with ANGIE Disla and I have reviewed the note below.     I agree with the findings, history, examination and assessment / plan as listed below with changes/addendum as listed below by me in my addendum / attestation separetely.     Laceration left lower extremity status post excisional debridement of left leg wound and layered closure by Dr. Bañuelos on June 28, 2019.  Postoperatively patient can be weightbearing as tolerated.  Follow-up outpatient in orthopedics clinic per orthopedics recommendation.    Repeat dose of ancef at 7 pm and then anticipate discharge home    No Abx needs    Outpatient wound clinic follow up / Ortho f/u and PCP recommended    Pain control on discharge     Jack Patel M.D.  06/28/19  1:09 PM

## 2019-06-28 NOTE — ANESTHESIA POSTPROCEDURE EVALUATION
Patient: Pippa Mcintosh    Procedure Summary     Date:  06/28/19 Room / Location:  [place] [address] / SURGERY [facility]    Anesthesia Start:  0318 Anesthesia Stop:  0352    Procedure:  IRRIGATION AND DEBRIDEMENT, WOUND (Left Leg Lower) Diagnosis:  (LEFT LEG LACERATION)    Surgeon:  Gordo Bañuelos M.D. Responsible Provider:  Jamil Espinal M.D.    Anesthesia Type:  general ASA Status:  2 - Emergent          Final Anesthesia Type: general  Last vitals  BP   Blood Pressure: 103/58, NIBP: (!) 95/58    Temp   36.2 °C (97.2 °F)    Pulse   Pulse: 74   Resp   13    SpO2   98 %      Anesthesia Post Evaluation    Patient location during evaluation: PACU  Patient participation: complete - patient participated  Level of consciousness: awake and alert  Pain score: 2    Airway patency: patent  Anesthetic complications: no  Cardiovascular status: hemodynamically stable  Respiratory status: acceptable  Hydration status: euvolemic    PONV: none           Nurse Pain Score: 1 (NPRS)

## 2019-06-28 NOTE — ED NOTES
Med Rec Updated and Complete per Pt at bedside  Allergies Reviewed  No PO ABX last 14 days.    Pt denies RX or OTC medication at this time.

## 2019-06-28 NOTE — ANESTHESIA TIME REPORT
Anesthesia Start and Stop Event Times     Date Time Event    6/28/2019 0318 Anesthesia Start     0352 Anesthesia Stop        Responsible Staff  06/28/19    Name Role Begin End    Jamil Espinal M.D. Anesth 0318 0352        Preop Diagnosis (Free Text):  Pre-op Diagnosis     LEFT LEG LACERATION        Preop Diagnosis (Codes):  Diagnosis Information     Diagnosis Code(s):         Post op Diagnosis  Leg laceration      Premium Reason  A. 3PM - 7AM    Comments: emergency

## 2019-06-28 NOTE — PROGRESS NOTES
Hospital Medicine Daily Progress Note    Date of Service  6/28/2019    Chief Complaint  Injury to LLE    Hospital Course   This is a 49-year-old female with PMH significant for psychiatric disorder not currently on medication who presented 6/27/2019 with LLE injury after falling through a piece of furniture.  She reports hardware on the furniture causing an avulsion to her LLE.  Imaging showed no abnormalities.  She underwent I&D on 6/28/2019.      Interval Problem Update  -surgery earlier this morning. Lethargic this afternoon. Arouses easily. Reports good pain control.  -LLE with Kerlex and ACE wrap without drainage. CMS intact. WBAT.  -she has already received Tetanus    Consultants/Specialty  Orthopedics    Code Status  FULL    Disposition  DC home tomorrow.     Review of Systems  Review of Systems   Constitutional: Positive for malaise/fatigue. Negative for chills and fever.   HENT: Negative.    Eyes: Negative.    Respiratory: Negative for cough, shortness of breath and wheezing.    Cardiovascular: Negative for chest pain, palpitations and leg swelling.   Gastrointestinal: Negative.    Genitourinary: Negative.    Musculoskeletal: Negative for falls, joint pain and myalgias.        BLE pain - L > R     Skin: Negative.    Neurological: Negative for dizziness, focal weakness, seizures and headaches.   Psychiatric/Behavioral: Negative for depression and memory loss. The patient is not nervous/anxious.    All other systems reviewed and are negative.       Physical Exam  Temp:  [36.1 °C (96.9 °F)-36.7 °C (98 °F)] 36.4 °C (97.6 °F)  Pulse:  [64-82] 76  Resp:  [12-19] 16  BP: ()/(54-72) 100/55  SpO2:  [94 %-100 %] 99 %    Physical Exam   Constitutional: She is oriented to person, place, and time. Vital signs are normal. She appears well-developed and well-nourished. She appears lethargic. She is easily aroused.  Non-toxic appearance. Nasal cannula in place.   HENT:   Head: Normocephalic.   Right Ear: Hearing  normal.   Left Ear: Hearing normal.   Nose: Nose normal.   Mouth/Throat: Oropharynx is clear and moist and mucous membranes are normal.   Eyes: Conjunctivae and EOM are normal. No scleral icterus.   Neck: Phonation normal. No JVD present.   Cardiovascular: Normal rate, normal heart sounds and intact distal pulses.    No edema   Pulmonary/Chest: Effort normal and breath sounds normal. She has no wheezes. She has no rhonchi.   Abdominal: Soft. Normal appearance. She exhibits no distension. There is no tenderness. There is no rigidity and no rebound.   Musculoskeletal: Normal range of motion.   Neurological: She is oriented to person, place, and time and easily aroused. She has normal strength. She appears lethargic. She displays a negative Romberg sign. GCS eye subscore is 4. GCS verbal subscore is 5. GCS motor subscore is 6.   Skin: Skin is warm and dry.   Psychiatric: She has a normal mood and affect. Her behavior is normal. Judgment normal. Cognition and memory are normal.   Nursing note and vitals reviewed.      Fluids    Intake/Output Summary (Last 24 hours) at 06/28/19 1259  Last data filed at 06/28/19 0351   Gross per 24 hour   Intake             1900 ml   Output              325 ml   Net             1575 ml       Laboratory  Recent Labs      06/27/19   1528   WBC  6.8   RBC  3.86*   HEMOGLOBIN  11.6*   HEMATOCRIT  35.2*   MCV  91.2   MCH  30.1   MCHC  33.0*   RDW  44.0   PLATELETCT  280   MPV  10.4     Recent Labs      06/27/19   1528   SODIUM  141   POTASSIUM  3.6   CHLORIDE  107   CO2  24   GLUCOSE  101*   BUN  17   CREATININE  0.92   CALCIUM  8.9                   Imaging  DX-TIBIA AND FIBULA LEFT   Final Result      Bilateral lower extremity soft tissue defect without underlying acute osseous abnormality. No evidence of retained radiopaque foreign body.           Assessment/Plan  Laceration of left lower extremity- (present on admission)   Assessment & Plan    -after falling through a piece of  furniture  -she has received a Tetanus shot  -s/p I&D today - dressing is CDI with intact CMS  -continue IV ABX x2 more doses  -pain management       Anemia- (present on admission)   Assessment & Plan    -normocytic anemia   -no indications of acute bleeding   -monitor labs            VTE prophylaxis: SCD    KELLIE Ford.

## 2019-06-28 NOTE — PROGRESS NOTES
Patient back to room from surgery/recovery via bed, pt lethargic, easily responds to verbal stimuli, patient denies any pain at this time. Left lower leg dressings in place, clean dry intact, no bleeding noted. Post op VS monitoring initiated.

## 2019-06-28 NOTE — CONSULTS
DATE OF SERVICE:  06/27/2019    ORTHOPEDIC CONSULTATION    REQUESTING PHYSICIAN:  Dr. Castañeda from the emergency department.    REASON FOR CONSULTATION:  Left leg deep laceration    CHIEF COMPLAINT:  Left leg pain.    HISTORY OF PRESENT ILLNESS:  The patient is a 49-year-old female.  She   presented to the emergency department at [facility] as she sustained left lower   extremity injury.  She states she fell through a piece of furniture that she   was standing on and fell through the furniture.  She feels that she sustained   a laceration from the furniture to this area.  She does have a history of   non-insulin dependent diabetes.  I was consulted to help provide treatment   recommendations for this deep laceration of the left leg.  She denies any   obvious numbness or paresthesias distally.  She is able to move her foot, but   it hurts.    PAST MEDICAL HISTORY:    ALLERGIES:  PENICILLIN.    OUTPATIENT MEDICATIONS:  None listed.    PAST MEDICAL DIAGNOSES:  Psychiatric disorder and history of drug dependence.    PAST SURGICAL HISTORY:  Pelviscopy and total abdominal hysterectomy.    FAMILY HISTORY:  Negative for significant medical problems according to the   medical record.    SOCIAL HISTORY:  The patient smokes a half pack of cigarettes a day.  She   drinks alcohol once a month.  She does use methamphetamine, reportedly   monthly.    REVIEW OF SYSTEMS:  She denies fevers, chills, nausea, vomiting, shortness of   breath, chest pain currently; otherwise, normal per AMA criteria other than   that already stated in the HPI.    PHYSICAL EXAMINATION:  VITAL SIGNS:  Temperature 98, heart rate 75, respiratory rate 17, blood   pressure 110/65, and pulse oximetry 100% on 1 liter nasal cannula.  GENERAL APPEARANCE:  The patient is little bit somnolent, but she wakes and   follows commands appropriately.  HEAD, EYES, EARS, NOSE, THROAT:  Normocephalic and atraumatic.  Mucous   membranes are moist.  PULMONARY:  Symmetric,  unlabored breathing.  CARDIOVASCULAR:  Extremities are well perfused.  ABDOMEN:  Not obviously distended.  MUSCULOSKELETAL:  Left lower extremity, she has a V-shaped laceration at the   lateral aspect of the leg extending down to muscle fascia  with an apex distal flap.    There is no obvious gross contamination present.  She is able to actively dorsi and   plantar flex her foot.  It does cause a little bit of pain with dorsiflexion.  She is able to   flex and extend her toes.  Sensation is diffusely intact to light touch in the   foot with palpable dorsalis pedis and posterior tibial pulse.  There is no   evidence of obvious traumatic deformity of the bilateral upper and right lower   extremity, which is grossly neurovascularly intact.    DIAGNOSTIC IMAGING:  Plain x-rays, 2 views of the left tibia showed no obvious   radiopaque retained foreign body, no fracture.  There is soft tissue defect   in the lateral left leg.    ASSESSMENT:  This is a 49-year-old female with left leg laceration with a   relatively large flap extending down to muscle fascia.  She is neurovascularly   intact.  She has normal motor function as well.    RECOMMENDATIONS:  The patient is being evaluated for admission to the   hospitalist service and Dr. Abad Castañeda has requested that I evaluate the   wound and I am happy to do so.  I feel that she would benefit most from going   to the operating room to thoroughly evaluate the extent of the laceration and   repair of the wound.  The patient is in agreement with this plan.    Patient should continue to be n.p.o. in chance we can get her to the operating   room this evening.  If for some reason, the operating room is unavailable, we   can try to perform this procedure tomorrow morning in the operating room.       ____________________________________     MD FARIHA Oliveira / TAMRA    DD:  06/27/2019 19:08:06  DT:  06/27/2019 19:38:18    D#:  [id number]  Job#:  [id number]

## 2019-06-28 NOTE — OR SURGEON
Immediate Post OP Note    PreOp Diagnosis: Left leg laceration    PostOp Diagnosis: same    Procedure(s):  IRRIGATION AND DEBRIDEMENT, WOUND - Wound Class: Clean Contaminated    Surgeon(s):  Gordo Bañuelos M.D.    Anesthesiologist/Type of Anesthesia:  Anesthesiologist: Jamil Espinal M.D./General    Surgical Staff:  Circulator: Rogelio Shepard R.N.; Kuhshi Camarena R.N.  Scrub Person: Rona Dueñas; Leopold Von C Garcia    Specimens removed if any:  * No specimens in log *    Estimated Blood Loss: minimal    Findings: see dictation    Complications: none known    PLAN:  --readmit medicine postop  --WBAT LLE  --ancef x 2 doses postop  --continue compressive dressing  --fu 2 weeks for wound check and staple removal        6/28/2019 3:43 AM Gordo Bañuelos M.D.

## 2019-06-28 NOTE — OP REPORT
DATE OF SERVICE:  06/28/2019    PREOPERATIVE DIAGNOSIS:  Left leg laceration.    POSTOPERATIVE DIAGNOSIS:  Left leg laceration.    PROCEDURE PERFORMED:  1.  Excisional debridement of left leg wound including skin and subcutaneous   tissue down to muscle fascia, wound measuring 7x7 cm in the V-shaped   configuration.  2.  Layered closure of left leg laceration measuring 14 cm in length.    SURGEON:  Gordo Bañuelos MD    ANESTHESIOLOGIST:  Jamil Espinal MD    ANESTHESIA:  General.    ESTIMATED BLOOD LOSS:  Minimal.    INDICATION FOR PROCEDURE:  The patient is a 49-year-old female.  She presented   to the emergency department earlier today with a traumatic laceration of her   left leg. I spoke with Dr. Castañeda in the emergency department and he felt   she would benefit most from the wound evaluation and repair by orthopedics and   I evaluated the wound.  It was extensive laceration extending down to the   muscle fascia and I felt that she would benefit from going to the operating   room just to explore the wound thoroughly and thoroughly excisionally debrided   and repaired in a layered fashion.  She signed informed consent   preoperatively and she wished to proceed as outlined above.    DESCRIPTION OF PROCEDURE:  The patient was met in the preop holding area.  Her   surgical site was signed, her consent was confirmed for accuracy.  She was   taken back to the operating room and general anesthesia was induced.  Ancef   was administered.  The left lower extremity was prepped and draped in the   usual sterile fashion.  A formal time-out was performed confirming the   patient's correct name, correct surgical site, correct procedure and correct   laterality.  The wound measured.  It was a V-shaped configuration with a   distally based apex and a proximally based flap.  Some of the skin had been   avulsed from the subcutaneous tissue, but overall it does appear to be   relatively viable.  The most questionable area  was the apex of the flap   itself.  Upon further evaluation of the wound, it did not appear that the   fascia of the leg was penetrated and the wound was involving the skin and   subcutaneous tissue down to the fascia itself.  Thorough lavage of the wound   was performed using a Pulsavac normal saline.  I did excise a portion of the   apex of the flap that appeared to be questionably viable with a 15 blade   scalpel.  After sufficient exploration and irrigation and debridement of the   wound was performed, I then reapproximated the subcutaneous layers with   interrupted 0 Vicryl, and the skin edges with staples.  There was no undue   tension on the skin.  The laceration measured 7 cm on each side, total length   of about 14 cm in length and the dimensions of the open wound itself measured   roughly 7x7 cm in triangular shape.  The wounds were then cleansed and dried,   applied Xeroform to the laceration, 4x4s, Sof-Rol, and a loosely applied   compressive dressing.  She was then awoken from anesthesia and transferred on   to the [facility] and taken to the postanesthesia care unit in stable condition.    PLAN:  1.  The patient will be readmitted to the medical service postop.  2.  She can be weightbearing as tolerated to the left lower extremity.  3.  She will need Ancef for 2 doses postoperatively for routine infection   prophylaxis.  4.  Recommend that we can continue with compressive dressing for now.  5.  She is to follow up with me in 2 weeks for routine wound check and staple   removal.       ____________________________________     MD FARIHA Oliveira / TAMRA    DD:  06/28/2019 03:51:59  DT:  06/28/2019 04:09:42    D#:  [id number]  Job#:  [id number]

## 2019-06-28 NOTE — OR NURSING
Cap refill sluggish on toes of operative foot but with pink color and warm.  Pt has sensation of all toes and denies any numbness or tingling.  Unwrapped ace and rewrapped the leg and this appeared to put cap refill at about 3 seconds.  Pt states pain is about a 2-3/10 scale.

## 2019-06-28 NOTE — OR NURSING
Pt states she may need more pain medicine before leaving for floor but insists she still is 3/10.  She mentioned while rolling in bed to room she may hit bumps that will increase her pain.

## 2019-06-29 NOTE — PROGRESS NOTES
Patient with discharge orders. IVF and PIV discontinued. Discharge teaching and instructions and given, pt verbalized understanding. Discharge instructions copy and script provided to patient. All patient belongings sent with patient. Patient discharged home in stable condition, picked up by friend MASSIMO Butt within normal limits. Wheeled to lobby via wheelchair escorted by CDU staff.

## 2019-06-29 NOTE — PROGRESS NOTES
Patient, OOB, ambulated w/ front wheel walker, SBA, able to bear weight on LLE against gravity, tolerated well.

## 2019-06-29 NOTE — DISCHARGE INSTRUCTIONS
DISCHARGE INSTRUCTIONS     Discharged to home by car with friend. Discharged via wheelchair, hospital escort: Yes.  Special equipment needed: Not Applicable    Be sure to schedule a follow-up appointment with your primary care doctor or any specialists as instructed.       Discharge Plan: Outpatient wound clinic follow up / Ortho f/u and PCP recommended     Pain control on discharge   Smoking Cessation Offered: Patient Refused (educated)  Influenza Vaccine Indication: Patient Refuses    I understand that a diet low in cholesterol, fat, and sodium is recommended for good health. Unless I have been given specific instructions below for another diet, I accept this instruction as my diet prescription.   Other diet: Heart Healthy    Special Instructions: Outpatient wound clinic follow up / Ortho f/u and PCP recommended      Wound Care, Adult    Taking care of your wound properly can help to prevent pain and infection. It can also help your wound to heal more quickly.  How is this treated?  Wound care  · Follow instructions from your health care provider about how to take care of your wound. Make sure you:  ¨ Wash your hands with soap and water before you change the bandage (dressing). If soap and water are not available, use hand .  ¨ Change your dressing as told by your health care provider.  ¨ Leave stitches (sutures), skin glue, or adhesive strips in place. These skin closures may need to stay in place for 2 weeks or longer. If adhesive strip edges start to loosen and curl up, you may trim the loose edges. Do not remove adhesive strips completely unless your health care provider tells you to do that.  · Check your wound area every day for signs of infection. Check for:  ¨ More redness, swelling, or pain.  ¨ More fluid or blood.  ¨ Warmth.  ¨ Pus or a bad smell.  · Ask your health care provider if you should clean the wound with mild soap and water. Doing this may include:  ¨ Using a clean towel to pat the  wound dry after cleaning it. Do not rub or scrub the wound.  ¨ Applying a cream or ointment. Do this only as told by your health care provider.  ¨ Covering the incision with a clean dressing.  · Ask your health care provider when you can leave the wound uncovered.  Medicines   · If you were prescribed an antibiotic medicine, cream, or ointment, take or use the antibiotic as told by your health care provider. Do not stop taking or using the antibiotic even if your condition improves.  · Take over-the-counter and prescription medicines only as told by your health care provider. If you were prescribed pain medicine, take it at least 30 minutes before doing any wound care or as told by your health care provider.  General instructions  · Return to your normal activities as told by your health care provider. Ask your health care provider what activities are safe.  · Do not scratch or pick at the wound.  · Keep all follow-up visits as told by your health care provider. This is important.  · Eat a diet that includes protein, vitamin A, vitamin C, and other nutrient-rich foods. These help the wound heal:  ¨ Protein-rich foods include meat, dairy, beans, nuts, and other sources.  ¨ Vitamin A-rich foods include carrots and dark green, leafy vegetables.  ¨ Vitamin C-rich foods include citrus, tomatoes, and other fruits and vegetables.  ¨ Nutrient-rich foods have protein, carbohydrates, fat, vitamins, or minerals. Eat a variety of healthy foods including vegetables, fruits, and whole grains.  Contact a health care provider if:  · You received a tetanus shot and you have swelling, severe pain, redness, or bleeding at the injection site.  · Your pain is not controlled with medicine.  · You have more redness, swelling, or pain around the wound.  · You have more fluid or blood coming from the wound.  · Your wound feels warm to the touch.  · You have pus or a bad smell coming from the wound.  · You have a fever or chills.  · You are  nauseous or you vomit.  · You are dizzy.  Get help right away if:  · You have a red streak going away from your wound.  · The edges of the wound open up and separate.  · Your wound is bleeding and the bleeding does not stop with gentle pressure.  · You have a rash.  · You faint.  · You have trouble breathing.  This information is not intended to replace advice given to you by your health care provider. Make sure you discuss any questions you have with your health care provider.  Document Released: 09/26/2009 Document Revised: 08/16/2017 Document Reviewed: 07/04/2017  [employer] Interactive Patient Education © 2017 [employer]      WOUND CLOSURE REMOVAL      The staples, stitches, or skin adhesives that were used to close your skin have been removed. You will need to continue the care described here until the wound is completely healed and your health care provider confirms that wound care can be stopped.  How do I care for my wound?  How you care for your wound after the wound closure has been removed depends on the kind of wound closure you had.  Stitches or Staples  · Keep the wound site dry and clean. Do not soak it in water.  · If skin adhesive strips were applied after the staples were removed, they will begin to peel off in a few days. Allow them to remain in place until they fall off on their own.  · If you still have a bandage (dressing), change it at least once a day or as directed by your health care provider. If the dressing sticks, pour warm, sterile water over it until it loosens and can be removed without pulling apart the wound edges. Pat the area dry with a soft, clean towel. Do not rub the wound because that may cause bleeding.  · Apply cream or ointment that stops the growth of bacteria (antibacterial cream or antibacterial ointment) only if your health care provider has directed you to do so.  · Place a nonstick bandage over the wound to prevent the dressing from sticking.  · Cover the nonstick  bandage with a new dressing as directed by your health care provider.  · If the bandage becomes wet or dirty or it develops a bad smell, change it as soon as possible.  · Take medicines only as directed by your health care provider.  Adhesive Strips or Glue  · Adhesive strips and glue peel off on their own.  · Leave adhesive strips and glue in place until they fall off.  Are there any bathing restrictions once my wound closure is removed?  Do not take baths, swim, or use a hot tub until your health care provider approves.  How can I decrease the size of my scar?  How your scar heals and the size of your scar depend on many factors, such as your age, the type of scar you have, and genetic factors. The following may help decrease the size of your scar:  · Sunscreen. Use sunscreen with a sun protection factor (SPF) of at least 15 when out in the sun. Reapply the sunscreen every two hours.  · Friction massage. Once your wound is completely healed, you can gently massage the scarred area. This can decrease scar thickness.  When should I seek help?  Seek help if:  · You have a fever.  · You have chills.  · You have drainage, redness, swelling, or pain at your wound.  · There is a bad smell coming from your wound.  · Your wound edges open up or do not stay closed after the wound closure has been removed.  This information is not intended to replace advice given to you by your health care provider. Make sure you discuss any questions you have with your health care provider.  Document Released: 11/30/2009 Document Revised: 05/31/2017 Document Reviewed: 05/05/2015  [employer]vier Interactive Patient Education © 2017 [employer]         Pain control on discharge                                                                                                           OPIOD OVERDOSE    Introduction  Opioids are substances that relieve pain by binding to pain receptors in your brain and spinal cord. Opioids include illegal drugs,  such as heroin, as well as prescription pain medicines. An opioid overdose happens when you take too much of an opioid substance. This can happen with any type of opioid, including:  · Heroin.  · Morphine.  · Codeine.  · Methadone.  · Oxycodone.  · Hydrocodone.  · Fentanyl.  · Hydromorphone.  · Buprenorphine.  The effects of an overdose can be mild, dangerous, or even deadly. Opioid overdose is a medical emergency.  What are the causes?  This condition may be caused by:  · Taking too much of an opioid by accident.  · Taking too much of an opioid on purpose.  · An error made by a health care provider who prescribes a medicine.  · An error made by the pharmacist who fills the prescription order.  · Using more than one substance that contains opioids at the same time.  · Mixing an opioid with a substance that affects your heart, breathing, or blood pressure. These include alcohol, tranquilizers, sleeping pills, illegal drugs, and some over-the-counter medicines.  What increases the risk?  This condition is more likely in:  · Children. They may be attracted to colorful pills. Because of a child's small size, even a small amount of a drug can be dangerous.  · Elderly people. They may be taking many different drugs. Elderly people may have difficulty reading labels or remembering when they last took their medicine.  · People who take an opioid on a long-term basis.  · People who use:  ¨ Illegal drugs.  ¨ Other substances, including alcohol, while using an opioid.  · People who have:  ¨ A history of drug or alcohol abuse.  ¨ Certain mental health conditions.  · People who take opioids that are not prescribed for them.  What are the signs or symptoms?  Symptoms of this condition depend on the type of opioid and the amount that was taken. Common symptoms include:  · Sleepiness or difficulty waking from sleep.  · Confusion.  · Slurred speech.  · Slowed breathing and a slow pulse.  · Nausea and vomiting.  · Abnormally small  pupils.  Signs and symptoms that require emergency treatment include:  · Cold, clammy, and pale skin.  · Blue lips and fingernails.  · Vomiting.  · Gurgling sounds in the throat.  · A pulse that is very slow or difficult to detect.  · Breathing that is very slow, noisy, or difficult to detect.  · Limp body.  · Inability to respond to speech or be awakened from sleep (stupor).  How is this diagnosed?  This condition is diagnosed based on your symptoms. It is important to tell your health care provider:  · All of the opioids that you took.  · When you took the opioids.  · Whether you were drinking alcohol or using other substances.  Your health care provider will do a physical exam. This exam may include:  · Checking and monitoring your heart rate and rhythm, your breathing rate and depth, your temperature, and your blood pressure (vital signs).  · Checking for abnormally small pupils.  · Measuring oxygen levels in your blood.  You may also have blood tests or urine tests.  How is this treated?  Supporting your vital signs and your breathing is the first step in treating an opioid overdose. Treatment may also include:  · Giving fluids and minerals (electrolytes) through an IV tube.  · Inserting a breathing tube (endotracheal tube) in your airway to help you breathe.  · Giving oxygen.  · Passing a tube through your nose and into your stomach (NG tube, or nasogastric tube) to wash out your stomach.  · Giving medicines that:  ¨ Increase your blood pressure.  ¨ Absorb any opioid that is in your digestive system.  ¨ Reverse the effects of the opioid (naloxone).  · Ongoing counseling and mental health support if you intentionally overdosed or used an illegal drug.  Follow these instructions at home:  · Take over-the-counter and prescription medicines only as told by your health care provider. Always ask your health care provider about possible side effects and interactions of any new medicine that you start taking.  · Keep  a list of all of the medicines that you take, including over-the-counter medicines. Bring this list with you to all of your medical visits.  · Drink enough fluid to keep your urine clear or pale yellow.  · Keep all follow-up visits as told by your health care provider. This is important.  How is this prevented?  · Get help if you are struggling with:  ¨ Alcohol or drug use.  ¨ Depression or another mental health problem.  · Keep the phone number of your local poison control center near your phone or on your cell phone.  · Store all medicines in safety containers that are out of the reach of children.  · Read the drug inserts that come with your medicines.  · Do not drink alcohol when taking opioids.  · Do not use illegal drugs.  · Do not take opioid medicines that are not prescribed for you.  Contact a health care provider if:  · Your symptoms return.  · You develop new symptoms or side effects when you are taking medicines.  Get help right away if:  · You think that you or someone else may have taken too much of an opioid. The hotline of the National Poison Control Center is (803) 039-7852.  · You or someone else is having symptoms of an opioid overdose.  · You have serious thoughts about hurting yourself or others.  · You have:  ¨ Chest pain.  ¨ Difficulty breathing.  ¨ A loss of consciousness.  Opioid overdose is an emergency. Do not wait to see if the symptoms will go away. Get medical help right away. Call your local emergency services (911 in the [place] Do not drive yourself to the hospital.   This information is not intended to replace advice given to you by your health care provider. Make sure you discuss any questions you have with your health care provider.  Document Released: 01/25/2006 Document Revised: 05/25/2017 Document Reviewed: 12/31/2014  © 2017 Elsevier          · Is patient discharged on Warfarin / Coumadin?   No     Depression / Suicide Risk    As you are discharged from [facility]  facility, it is important to learn how to keep safe from harming yourself.    Recognize the warning signs:  · Abrupt changes in personality, positive or negative- including increase in energy   · Giving away possessions  · Change in eating patterns- significant weight changes-  positive or negative  · Change in sleeping patterns- unable to sleep or sleeping all the time   · Unwillingness or inability to communicate  · Depression  · Unusual sadness, discouragement and loneliness  · Talk of wanting to die  · Neglect of personal appearance   · Rebelliousness- reckless behavior  · Withdrawal from people/activities they love  · Confusion- inability to concentrate     If you or a loved one observes any of these behaviors or has concerns about self-harm, here's what you can do:  · Talk about it- your feelings and reasons for harming yourself  · Remove any means that you might use to hurt yourself (examples: pills, rope, extension cords, firearm)  · Get professional help from the community (Mental Health, Substance Abuse, psychological counseling)  · Do not be alone:Call your Safe Contact- someone whom you trust who will be there for you.  · Call your local CRISIS HOTLINE 113-1443 or 553-300-9473  · Call your local Children's Mobile Crisis Response Team [place] (614) 409-4895 or www.[employer]  · Call the toll free National Suicide Prevention Hotlines   · National Suicide Prevention Lifeline 126-962-TFZZ (0059)  · National Hope Line Network 800-SUICIDE (870-9377)

## 2019-06-29 NOTE — PROGRESS NOTES
Report from Jaron BEAR. Assumed patient care. Received patient in bed, up for meal, AOx4.LLEw/ CDI compression dressing/bandage, elevated with pillow, patient sensation intact, denies any pain or discomfort at this time.

## 2022-07-02 ENCOUNTER — HOSPITAL ENCOUNTER (EMERGENCY)
Facility: MEDICAL CENTER | Age: 52
End: 2022-07-02
Attending: EMERGENCY MEDICINE
Payer: COMMERCIAL

## 2022-07-02 ENCOUNTER — APPOINTMENT (OUTPATIENT)
Dept: RADIOLOGY | Facility: MEDICAL CENTER | Age: 52
End: 2022-07-02
Attending: EMERGENCY MEDICINE
Payer: COMMERCIAL

## 2022-07-02 VITALS
OXYGEN SATURATION: 99 % | TEMPERATURE: 96.2 F | HEIGHT: 65 IN | BODY MASS INDEX: 26.23 KG/M2 | SYSTOLIC BLOOD PRESSURE: 109 MMHG | WEIGHT: 157.41 LBS | DIASTOLIC BLOOD PRESSURE: 72 MMHG | RESPIRATION RATE: 15 BRPM | HEART RATE: 89 BPM

## 2022-07-02 DIAGNOSIS — M54.9 PAIN, UPPER BACK: ICD-10-CM

## 2022-07-02 DIAGNOSIS — S20.229A CONTUSION OF UPPER BACK, UNSPECIFIED LATERALITY, INITIAL ENCOUNTER: ICD-10-CM

## 2022-07-02 DIAGNOSIS — K59.00 CONSTIPATION, UNSPECIFIED CONSTIPATION TYPE: ICD-10-CM

## 2022-07-02 PROCEDURE — 700111 HCHG RX REV CODE 636 W/ 250 OVERRIDE (IP): Performed by: EMERGENCY MEDICINE

## 2022-07-02 PROCEDURE — 72080 X-RAY EXAM THORACOLMB 2/> VW: CPT

## 2022-07-02 PROCEDURE — 74019 RADEX ABDOMEN 2 VIEWS: CPT

## 2022-07-02 PROCEDURE — 96372 THER/PROPH/DIAG INJ SC/IM: CPT

## 2022-07-02 PROCEDURE — 99283 EMERGENCY DEPT VISIT LOW MDM: CPT

## 2022-07-02 RX ORDER — CYCLOBENZAPRINE HCL 10 MG
10 TABLET ORAL EVERY 8 HOURS PRN
Qty: 21 TABLET | Refills: 0 | Status: SHIPPED | OUTPATIENT
Start: 2022-07-02

## 2022-07-02 RX ORDER — NAPROXEN 500 MG/1
500 TABLET ORAL 2 TIMES DAILY WITH MEALS
Qty: 20 TABLET | Refills: 0 | Status: SHIPPED | OUTPATIENT
Start: 2022-07-02 | End: 2022-07-12

## 2022-07-02 RX ORDER — KETOROLAC TROMETHAMINE 30 MG/ML
30 INJECTION, SOLUTION INTRAMUSCULAR; INTRAVENOUS ONCE
Status: COMPLETED | OUTPATIENT
Start: 2022-07-02 | End: 2022-07-02

## 2022-07-02 RX ADMIN — KETOROLAC TROMETHAMINE 30 MG: 30 INJECTION, SOLUTION INTRAMUSCULAR at 17:40

## 2022-07-02 NOTE — ED TRIAGE NOTES
"Chief Complaint   Patient presents with   • Back Pain     GLF x 5 days PTA. Thoracic pain described by pt. No BM since fall. Pt denies incontinence. CMS intact. Ambulating without assistance with steady gait.        BP (!) 140/75   Pulse 94   Temp 36 °C (96.8 °F) (Temporal)   Resp 14   Ht 1.651 m (5' 5\")   SpO2 96%   BMI 27.26 kg/m²   "

## 2022-07-02 NOTE — ED NOTES
Pt ambulated from lobby to [facility] for CC back pain. Steady gait observed. Chart up for ERP.

## 2022-07-02 NOTE — ED NOTES
No bruising, redness or swelling to back where pt hit, denies any incontinence, but has not had a BM since the fall and this is not normal for her.

## 2022-07-02 NOTE — ED PROVIDER NOTES
ED Provider Note        Primary care provider: No primary care provider on file.    I verified that the patient was wearing a mask and I was wearing appropriate PPE every time I entered the room. The patient's mask was on the patient at all times during my encounter except for a brief view of the oropharynx.      CHIEF COMPLAINT  Chief Complaint   Patient presents with   • Back Pain     GLF x 5 days PTA. Thoracic pain described by pt. No BM since fall. Pt denies incontinence. CMS intact. Ambulating without assistance with steady gait.        HPI  Pippa Mcintosh is a 52 y.o. female who presents to the Emergency Department with chief complaint of back pain.  Patient fell off of her back stretching apparatus.  Patient stated that she fell backwards in the Only position landing on a pole.  Stated that since that time she has had some moderate upper back pain she is describes the pain as a stabbing pain where her bra line is.  She reports that initially she had some numbness and tingling in her arms but states that this is resolved.  Went to primary care and was urged to come here as they were concerned that she had not had a bowel movement since the event.  This was 2 days prior she reports that she does feel constipated but she is passing gas she has no urinary retention she has no saddle anesthesia no weakness numbness tingling in her legs she denies any abdominal pain she is had no fevers or chills no recent instrumentation or drug use.    REVIEW OF SYSTEMS  10 systems reviewed and otherwise negative, pertinent positives and negatives listed in the history of present illness.    PAST MEDICAL HISTORY   has a past medical history of Drug abuse (HCC) and Psychiatric disorder.    SURGICAL HISTORY   has a past surgical history that includes hysterectomy, total abdominal; pelviscopy (4/23/08); and irrigation & debridement ortho (Left, 6/28/2019).    SOCIAL HISTORY  Social History     Tobacco Use   • Smoking status:  "Current Every Day Smoker   • Smokeless tobacco: Never Used   • Tobacco comment: 1/2 ppd   Substance Use Topics   • Alcohol use: Yes     Comment: ONCE A MONTH   • Drug use: Yes     Comment: meth, MONTHLY      Social History     Substance and Sexual Activity   Drug Use Yes    Comment: meth, MONTHLY       FAMILY HISTORY  Non-Contributory    CURRENT MEDICATIONS  Home Medications    **Home medications have not yet been reviewed for this encounter**         ALLERGIES  Allergies   Allergen Reactions   • Penicillins      Swelling from Hands up to Face.       PHYSICAL EXAM  VITAL SIGNS: BP (!) 140/75   Pulse 94   Temp 36 °C (96.8 °F) (Temporal)   Resp 14   Ht 1.651 m (5' 5\")   Wt 71.4 kg (157 lb 6.5 oz)   SpO2 96%   BMI 26.19 kg/m²   Pulse ox interpretation: I interpret this pulse ox as normal.  Constitutional: Alert and oriented x 3, minimal distress  HEENT: Atraumatic normocephalic, pupils are equal round, extraocular movements are intact. The nares is clear, external ears are normal, mouth shows moist mucous membranes  Neck: no obvious JVD or tracheal deviation  Cardiovascular: Regular rate and rhythm no murmur rub or gallop   Thorax & Lungs: No respiratory distress, no wheezes rales or rhonchi, No chest tenderness.   GI: Soft nontender nondistended positive bowel sounds, no peritoneal signs  Skin: Warm dry no obvious acute rash or lesion  Musculoskeletal: Moving all extremities with normal range strength, no acute  deformity Minor tenderness to palpation both midline and paraspinally in the thoracic region between the shoulder blades there is no ecchymosis there is no step-off there is no erythema warmth or induration lumbar spines are unremarkable.  Neurologic: Cranial nerves III through XII are grossly intact, no sensory deficit, no cerebellar dysfunction   Psychiatric: Appropriate affect for situation at this time      DIAGNOSTIC STUDIES / PROCEDURES  LABS          All labs reviewed by " "me.      RADIOLOGY  [id number] [id number]   Final Result      No dilated bowel identified      DX-THORACOLUMBAR SPINE-2 VIEWS   Final Result      1.  Negative for fracture      2.  Lumbar spine facet arthropathy            COURSE & MEDICAL DECISION MAKING  Pertinent Labs & Imaging studies reviewed. (See chart for details)    4:47 PM - Patient seen and examined at bedside.     Patient noted to have slightly elevated blood pressure likely circumstantial secondary to presenting complaint. Referred to primary care physician for further evaluation.    Medical Decision Making: X-rays negative patient feeling better at this time given instructions to return for worsening abdominal pain failure to pass stool worsening back pain numbness tingling weakness any other acute symptom change or concerns otherwise discharged in stable and improved condition.    BP (!) 140/75   Pulse 94   Temp 36 °C (96.8 °F) (Temporal)   Resp 14   Ht 1.651 m (5' 5\")   Wt 71.4 kg (157 lb 6.5 oz)   SpO2 96%   BMI 26.19 kg/m²     [address] [address]  [phone]    for establishment of primary care, for blood pressure management    [facility], Emergency Dept  [address] [facility] [address] [phone]    in 12-24 hours if symptoms persist, immediately If symptoms worsen, or if you develop any other symptoms or concerns      Discharge Medication List as of 7/2/2022  6:04 PM      START taking these medications    Details   naproxen (NAPROSYN) 500 MG Tab Take 1 Tablet by mouth 2 times a day with meals for 10 days., Disp-20 Tablet, R-0, Normal      cyclobenzaprine (FLEXERIL) 10 mg Tab Take 1 Tablet by mouth every 8 hours as needed for Muscle Spasms., Disp-21 Tablet, R-0, Normal             FINAL IMPRESSION  1. Pain, upper back Active   2. Contusion of upper back, unspecified laterality, initial encounter Active   3. Constipation, unspecified constipation type          This dictation " has been created using voice recognition software and/or scribes. The accuracy of the dictation is limited by the abilities of the software and the expertise of the scribes. I expect there may be some errors of grammar and possibly content. I made every attempt to manually correct the errors within my dictation. However, errors related to voice recognition software and/or scribes may still exist and should be interpreted within the appropriate context.

## 2024-12-30 NOTE — PROGRESS NOTES
Pt more awake, not falling asleep during middle of conversation.     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 Patient was last examined by me on October 16, 2023. She has been taking 200 mg of Plaquenil once daily since 2020. She reports no ocular complaints. She feels her vision is stable. She denies eye pain, irritation, redness, photophobia, burning, tearing, itching, discharge, dryness, foreign body sensation, decreased vision, diplopia, photopsia, and floaters. She is happy with the vision and comfort with her contact lenses.     ASSESSMENT:  1. Juvenile dermatomyositis (CMD)    2. Long-term use of Plaquenil    3. History of refractive amblyopia of both eyes     4. Hyperopia of both eyes with regular astigmatism    5. Encounter for fitting or adjustment of spectacles or contact lenses        PLAN:  Orders Placed This Encounter   Procedures    REFRACTION    BASIC FIT EXISTING WEARER    SCANNING COMPUTERIZED OPHTHALMIC IMAGING RETINA    VISUAL FIELD EXAM EXTENDED     -New prescriptions given for eyeglasses and contact lenses as written below. Recommend 100% UV protection when outdoors to lessen the risk of cataracts, macular degeneration, and eyelid skin cancers. Emphasized the importance of proper care and handling of contact lenses including the daily use of multipurpose solutions if not wearing daily disposables, removing the lenses before bedtime, and replacing lenses as prescribed to prevent complications. Patient was instructed to return immediately with eye pain/irritation, light sensitivity, redness, discharge, or  decreased vision.  -Patient currently does not exhibit macular toxicity from the long-term use of hydroxychloroquine (Plaquenil). Today's exam, macular spectral domain optical coherence tomography, and Overton 10-2 visual field testing are unremarkable. The risk of macular toxicity was reviewed at length along with the need for yearly followup to screen for sequelae. I discussed eye conditions associated with autoimmune disease (dry eye syndrome, episcleritis, scleritis, and uveitis) and instructed her to return with eye pain/irritation, redness, light sensitivity, or decreased acuity. I will reevaluate in one year with a comprehensive dilated eye examination, macular spectral domain optical coherence tomography, Overton 10-2 visual field testing, color vision testing, and red Amsler grid testing.     FOLLOWUP:  Return in about 1 year (around 12/30/2025) for eye exam, VF 10-2, OCT (macular), color, & Amsler.      FINAL Rx GLASSES:  Final Rx         Sphere Cylinder Axis    Right +3.75 +1.25 080    Left +3.75 +1.75 100      Expiration Date: 12/30/2025            FINAL Rx CONTACTS:  Final Contact Lens Rx         Brand Base Curve Diameter Sphere Cylinder Axis    Right Ultra for Astigmatism 8.6 14.5 +4.50 -1.25 180    Left Ultra for Astigmatism 8.6 14.5 +5.00 -1.75 180      Expiration Date: 12/30/2025            Visual Field Interpretation (Overton  VF 10-2):  Patient Reliability:     Right eye:  Adequate     Left eye:    Adequate    Visual Field Findings:     Right eye:  Normal visual field, unchanged compared to last visual field test.   Left eye:     Normal visual field, unchanged compared to last visual field test.    Optical Coherence Tomography Macular Scan Analysis:    Right eye:  Normal without thinning, thickening or intraretinal edema, no pigmentary disruption, normal foveal contour, stable compared to last OCT.    Left eye:     Normal without thinning, thickening, or intraretinal edema, no pigmentary  disruption, normal foveal contour, stable compared to last OCT.      I reviewed the allergies, medication list, and past medical, family, and social history sections listed in the medical record as well as any pertinent nursing/tech notes and recent labs.

## 2025-02-26 ENCOUNTER — HOSPITAL ENCOUNTER (EMERGENCY)
Facility: MEDICAL CENTER | Age: 55
End: 2025-02-27
Attending: EMERGENCY MEDICINE
Payer: COMMERCIAL

## 2025-02-26 DIAGNOSIS — T31.10 BURNS INVOLVING 10-19% OF BODY SURFACE: ICD-10-CM

## 2025-02-26 DIAGNOSIS — R50.9 HYPERTHERMIA: ICD-10-CM

## 2025-02-26 DIAGNOSIS — T14.90XA TRAUMA: ICD-10-CM

## 2025-02-26 PROCEDURE — 85730 THROMBOPLASTIN TIME PARTIAL: CPT

## 2025-02-26 PROCEDURE — 36415 COLL VENOUS BLD VENIPUNCTURE: CPT

## 2025-02-26 PROCEDURE — 85347 COAGULATION TIME ACTIVATED: CPT

## 2025-02-26 PROCEDURE — 84703 CHORIONIC GONADOTROPIN ASSAY: CPT

## 2025-02-26 PROCEDURE — 82803 BLOOD GASES ANY COMBINATION: CPT

## 2025-02-26 PROCEDURE — 86850 RBC ANTIBODY SCREEN: CPT

## 2025-02-26 PROCEDURE — 303105 HCHG CATHETER EXTRA

## 2025-02-26 PROCEDURE — 85384 FIBRINOGEN ACTIVITY: CPT

## 2025-02-26 PROCEDURE — G0390 TRAUMA RESPONS W/HOSP CRITI: HCPCS

## 2025-02-26 PROCEDURE — 51702 INSERT TEMP BLADDER CATH: CPT

## 2025-02-26 PROCEDURE — 96374 THER/PROPH/DIAG INJ IV PUSH: CPT | Mod: XU

## 2025-02-26 PROCEDURE — 85027 COMPLETE CBC AUTOMATED: CPT

## 2025-02-26 PROCEDURE — 82077 ASSAY SPEC XCP UR&BREATH IA: CPT

## 2025-02-26 PROCEDURE — 80053 COMPREHEN METABOLIC PANEL: CPT

## 2025-02-26 PROCEDURE — 700111 HCHG RX REV CODE 636 W/ 250 OVERRIDE (IP): Mod: UD | Performed by: SURGERY

## 2025-02-26 PROCEDURE — 83605 ASSAY OF LACTIC ACID: CPT

## 2025-02-26 PROCEDURE — 85576 BLOOD PLATELET AGGREGATION: CPT

## 2025-02-26 PROCEDURE — 700101 HCHG RX REV CODE 250: Mod: UD | Performed by: SURGERY

## 2025-02-26 PROCEDURE — 85610 PROTHROMBIN TIME: CPT

## 2025-02-26 PROCEDURE — 86900 BLOOD TYPING SEROLOGIC ABO: CPT

## 2025-02-26 PROCEDURE — 99291 CRITICAL CARE FIRST HOUR: CPT

## 2025-02-26 PROCEDURE — 86901 BLOOD TYPING SEROLOGIC RH(D): CPT

## 2025-02-26 RX ADMIN — ROCURONIUM BROMIDE 80 MG: 10 INJECTION, SOLUTION INTRAVENOUS at 23:59

## 2025-02-26 RX ADMIN — PROPOFOL 40 MG: 10 INJECTION, EMULSION INTRAVENOUS at 23:58

## 2025-02-27 ENCOUNTER — APPOINTMENT (OUTPATIENT)
Dept: RADIOLOGY | Facility: MEDICAL CENTER | Age: 55
End: 2025-02-27
Attending: NURSE PRACTITIONER
Payer: COMMERCIAL

## 2025-02-27 ENCOUNTER — APPOINTMENT (OUTPATIENT)
Dept: RADIOLOGY | Facility: MEDICAL CENTER | Age: 55
End: 2025-02-27
Attending: SURGERY
Payer: COMMERCIAL

## 2025-02-27 ENCOUNTER — APPOINTMENT (OUTPATIENT)
Dept: RADIOLOGY | Facility: MEDICAL CENTER | Age: 55
End: 2025-02-27
Attending: EMERGENCY MEDICINE
Payer: COMMERCIAL

## 2025-02-27 VITALS
BODY MASS INDEX: 23.32 KG/M2 | HEIGHT: 65 IN | DIASTOLIC BLOOD PRESSURE: 54 MMHG | SYSTOLIC BLOOD PRESSURE: 103 MMHG | OXYGEN SATURATION: 98 % | HEART RATE: 104 BPM | TEMPERATURE: 98.2 F | WEIGHT: 140 LBS | RESPIRATION RATE: 31 BRPM

## 2025-02-27 PROBLEM — T14.90XA TRAUMA: Status: ACTIVE | Noted: 2025-02-27

## 2025-02-27 PROBLEM — R79.89 ELEVATED SERUM CREATININE: Status: ACTIVE | Noted: 2025-02-27

## 2025-02-27 PROBLEM — R50.9 HYPERTHERMIA: Status: ACTIVE | Noted: 2025-02-27

## 2025-02-27 PROBLEM — J96.00 ACUTE RESPIRATORY FAILURE (HCC): Status: ACTIVE | Noted: 2025-02-27

## 2025-02-27 PROBLEM — T31.50: Status: ACTIVE | Noted: 2025-02-27

## 2025-02-27 PROBLEM — E87.20 LACTIC ACIDOSIS: Status: ACTIVE | Noted: 2025-02-27

## 2025-02-27 PROBLEM — R41.82 ALTERED MENTAL STATUS: Status: ACTIVE | Noted: 2025-02-27

## 2025-02-27 LAB
ABO + RH BLD: NORMAL
ABO GROUP BLD: NORMAL
ALBUMIN SERPL BCP-MCNC: 4.3 G/DL (ref 3.2–4.9)
ALBUMIN/GLOB SERPL: 1.5 G/DL
ALP SERPL-CCNC: 102 U/L (ref 30–99)
ALT SERPL-CCNC: 23 U/L (ref 2–50)
AMPHET UR QL SCN: POSITIVE
ANION GAP SERPL CALC-SCNC: 16 MMOL/L (ref 7–16)
APTT PPP: 22.6 SEC (ref 24.7–36)
AST SERPL-CCNC: 34 U/L (ref 12–45)
BARBITURATES UR QL SCN: NEGATIVE
BASE EXCESS BLDA CALC-SCNC: -4 MMOL/L (ref -4–3)
BASE EXCESS BLDA CALC-SCNC: -6 MMOL/L (ref -4–3)
BENZODIAZ UR QL SCN: NEGATIVE
BILIRUB SERPL-MCNC: 0.2 MG/DL (ref 0.1–1.5)
BLD GP AB SCN SERPL QL: NORMAL
BODY TEMPERATURE: 37.9 CENTIGRADE
BODY TEMPERATURE: ABNORMAL DEGREES
BREATHS SETTING VENT: 26
BUN SERPL-MCNC: 19 MG/DL (ref 8–22)
BZE UR QL SCN: NEGATIVE
CALCIUM ALBUM COR SERPL-MCNC: 9.3 MG/DL (ref 8.5–10.5)
CALCIUM SERPL-MCNC: 9.5 MG/DL (ref 8.5–10.5)
CANNABINOIDS UR QL SCN: NEGATIVE
CFT BLD TEG: 3.2 MIN (ref 4.6–9.1)
CFT P HPASE BLD TEG: 3.7 MIN (ref 4.3–8.3)
CHLORIDE SERPL-SCNC: 106 MMOL/L (ref 96–112)
CLOT ANGLE BLD TEG: 72.1 DEGREES (ref 63–78)
CLOT LYSIS 30M P MA LENFR BLD TEG: 0 % (ref 0–2.6)
CO2 BLDA-SCNC: 23 MMOL/L (ref 32–48)
CO2 SERPL-SCNC: 20 MMOL/L (ref 20–33)
CREAT SERPL-MCNC: 1.64 MG/DL (ref 0.5–1.4)
CT.EXTRINSIC BLD ROTEM: 1.8 MIN (ref 0.8–2.1)
DELSYS IDSYS: ABNORMAL
EKG IMPRESSION: NORMAL
ERYTHROCYTE [DISTWIDTH] IN BLOOD BY AUTOMATED COUNT: 44.6 FL (ref 35.9–50)
ETHANOL BLD-MCNC: <10.1 MG/DL
FENTANYL UR QL: NEGATIVE
GFR SERPLBLD CREATININE-BSD FMLA CKD-EPI: 36 ML/MIN/1.73 M 2
GLOBULIN SER CALC-MCNC: 2.8 G/DL (ref 1.9–3.5)
GLUCOSE SERPL-MCNC: 151 MG/DL (ref 65–99)
HCG SERPL QL: NEGATIVE
HCO3 BLDA-SCNC: 20 MMOL/L (ref 21–28)
HCO3 BLDA-SCNC: 21.5 MMOL/L (ref 21–28)
HCT VFR BLD AUTO: 38.4 % (ref 37–47)
HGB BLD-MCNC: 12.7 G/DL (ref 12–16)
HOROWITZ INDEX BLDA+IHG-RTO: 256 MM[HG]
INHALED O2 FLOW RATE: 15 L/MIN (ref 2–10)
INR PPP: 0.98 (ref 0.87–1.13)
LACTATE BLD-SCNC: 2.2 MMOL/L (ref 0.5–2)
LACTATE SERPL-SCNC: 4.7 MMOL/L (ref 0.5–2)
MCF BLD TEG: 56.7 MM (ref 52–69)
MCF.PLATELET INHIB BLD ROTEM: 18.8 MM (ref 15–32)
MCH RBC QN AUTO: 30.1 PG (ref 27–33)
MCHC RBC AUTO-ENTMCNC: 33.1 G/DL (ref 32.2–35.5)
MCV RBC AUTO: 91 FL (ref 81.4–97.8)
METHADONE UR QL SCN: NEGATIVE
MODE IMODE: ABNORMAL
O2/TOTAL GAS SETTING VFR VENT: 50 %
OPIATES UR QL SCN: NEGATIVE
OXYCODONE UR QL SCN: NEGATIVE
PA AA BLD-ACNC: 5.9 % (ref 0–11)
PA ADP BLD-ACNC: 11.1 % (ref 0–17)
PCO2 BLDA: 30.3 MMHG (ref 32–48)
PCO2 BLDA: 47.9 MMHG (ref 32–48)
PCO2 TEMP ADJ BLDA: 31.5 MMHG (ref 32–48)
PCO2 TEMP ADJ BLDA: 47.7 MMHG (ref 32–48)
PCP UR QL SCN: NEGATIVE
PEEP END EXPIRATORY PRESSURE IPEEP: 8 CMH20
PH BLDA: 7.26 [PH] (ref 7.35–7.45)
PH BLDA: 7.43 [PH] (ref 7.35–7.45)
PH TEMP ADJ BLDA: 7.26 [PH] (ref 7.35–7.45)
PH TEMP ADJ BLDA: 7.42 [PH] (ref 7.35–7.45)
PLATELET # BLD AUTO: 347 K/UL (ref 164–446)
PMV BLD AUTO: 10.5 FL (ref 9–12.9)
PO2 BLDA: 128 MMHG (ref 83–108)
PO2 BLDA: 247.8 MMHG (ref 83–108)
PO2 TEMP ADJ BLDA: 127 MMHG (ref 83–108)
PO2 TEMP ADJ BLDA: 252.1 MMHG (ref 83–108)
POTASSIUM SERPL-SCNC: 4.4 MMOL/L (ref 3.6–5.5)
PROPOXYPH UR QL SCN: NEGATIVE
PROT SERPL-MCNC: 7.1 G/DL (ref 6–8.2)
PROTHROMBIN TIME: 13 SEC (ref 12–14.6)
RBC # BLD AUTO: 4.22 M/UL (ref 4.2–5.4)
RH BLD: NORMAL
SAO2 % BLDA: 98 % (ref 93–99)
SAO2 % BLDA: 98.9 % (ref 93–99)
SODIUM SERPL-SCNC: 142 MMOL/L (ref 135–145)
SPECIMEN DRAWN FROM PATIENT: ABNORMAL
TEG ALGORITHM TGALG: ABNORMAL
TIDAL VOLUME IVT: 360 ML
WBC # BLD AUTO: 8.5 K/UL (ref 4.8–10.8)

## 2025-02-27 PROCEDURE — 70450 CT HEAD/BRAIN W/O DYE: CPT

## 2025-02-27 PROCEDURE — 700111 HCHG RX REV CODE 636 W/ 250 OVERRIDE (IP): Mod: JZ,UD | Performed by: EMERGENCY MEDICINE

## 2025-02-27 PROCEDURE — 93005 ELECTROCARDIOGRAM TRACING: CPT | Mod: TC | Performed by: EMERGENCY MEDICINE

## 2025-02-27 PROCEDURE — 83605 ASSAY OF LACTIC ACID: CPT

## 2025-02-27 PROCEDURE — 90715 TDAP VACCINE 7 YRS/> IM: CPT

## 2025-02-27 PROCEDURE — 94002 VENT MGMT INPAT INIT DAY: CPT

## 2025-02-27 PROCEDURE — 31500 INSERT EMERGENCY AIRWAY: CPT

## 2025-02-27 PROCEDURE — 96375 TX/PRO/DX INJ NEW DRUG ADDON: CPT | Mod: XU

## 2025-02-27 PROCEDURE — 99291 CRITICAL CARE FIRST HOUR: CPT | Performed by: SURGERY

## 2025-02-27 PROCEDURE — 80307 DRUG TEST PRSMV CHEM ANLYZR: CPT

## 2025-02-27 PROCEDURE — 96376 TX/PRO/DX INJ SAME DRUG ADON: CPT | Mod: XU

## 2025-02-27 PROCEDURE — 90471 IMMUNIZATION ADMIN: CPT

## 2025-02-27 PROCEDURE — 71045 X-RAY EXAM CHEST 1 VIEW: CPT

## 2025-02-27 PROCEDURE — 36600 WITHDRAWAL OF ARTERIAL BLOOD: CPT | Mod: XU

## 2025-02-27 PROCEDURE — 92950 HEART/LUNG RESUSCITATION CPR: CPT

## 2025-02-27 PROCEDURE — 82803 BLOOD GASES ANY COMBINATION: CPT

## 2025-02-27 PROCEDURE — 700111 HCHG RX REV CODE 636 W/ 250 OVERRIDE (IP)

## 2025-02-27 PROCEDURE — 700105 HCHG RX REV CODE 258: Mod: UD | Performed by: EMERGENCY MEDICINE

## 2025-02-27 RX ORDER — 0.9 % SODIUM CHLORIDE 0.9 %
INTRAVENOUS SOLUTION INTRAVENOUS
Status: DISCONTINUED
Start: 2025-02-27 | End: 2025-02-27 | Stop reason: HOSPADM

## 2025-02-27 RX ORDER — ROCURONIUM BROMIDE 10 MG/ML
INJECTION, SOLUTION INTRAVENOUS
Status: COMPLETED | OUTPATIENT
Start: 2025-02-26 | End: 2025-02-26

## 2025-02-27 RX ORDER — SODIUM CHLORIDE 9 MG/ML
INJECTION, SOLUTION INTRAVENOUS CONTINUOUS
Status: DISCONTINUED | OUTPATIENT
Start: 2025-02-27 | End: 2025-02-27 | Stop reason: HOSPADM

## 2025-02-27 RX ORDER — SODIUM CHLORIDE 9 MG/ML
INJECTION, SOLUTION INTRAVENOUS
Status: COMPLETED | OUTPATIENT
Start: 2025-02-27 | End: 2025-02-27

## 2025-02-27 RX ADMIN — PROPOFOL 30 MCG/KG/MIN: 10 INJECTION, EMULSION INTRAVENOUS at 00:30

## 2025-02-27 RX ADMIN — FENTANYL CITRATE 75 MCG: 50 INJECTION, SOLUTION INTRAMUSCULAR; INTRAVENOUS at 02:48

## 2025-02-27 RX ADMIN — CLOSTRIDIUM TETANI TOXOID ANTIGEN (FORMALDEHYDE INACTIVATED), CORYNEBACTERIUM DIPHTHERIAE TOXOID ANTIGEN (FORMALDEHYDE INACTIVATED), BORDETELLA PERTUSSIS TOXOID ANTIGEN (GLUTARALDEHYDE INACTIVATED), BORDETELLA PERTUSSIS FILAMENTOUS HEMAGGLUTININ ANTIGEN (FORMALDEHYDE INACTIVATED), BORDETELLA PERTUSSIS PERTACTIN ANTIGEN, AND BORDETELLA PERTUSSIS FIMBRIAE 2/3 ANTIGEN 0.5 ML: 5; 2; 2.5; 5; 3; 5 INJECTION, SUSPENSION INTRAMUSCULAR at 00:07

## 2025-02-27 RX ADMIN — SODIUM CHLORIDE 2 L: 9 INJECTION, SOLUTION INTRAVENOUS at 00:11

## 2025-02-27 RX ADMIN — SODIUM CHLORIDE: 9 INJECTION, SOLUTION INTRAVENOUS at 01:04
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
## 2025-02-27 NOTE — DISCHARGE PLANNING
"MERLIN spoke with emergency contact Ewa Davenport at [phone] to update her of transfer. Ewa will be taking care of pt's dogs. Ewa stated she will visit pt at [facility] in the next few days.    Ewa wanted to make sure pt had her phone and wallet with her. SW located items in belongings bag. Ewa wanted to pick them up to help pt \"pay bills\" but MERLIN let Ewa know that pt will be leaving hospital ASA and that her items will be taken with her and pt can give her the belongings at a later time. Ewa thanks this SW for update.   "
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
## 2025-02-27 NOTE — ASSESSMENT & PLAN NOTE
Intubated in trauma bay for airway protection.  Continue full mechanical ventilatory support.   Ventilator bundle and Trauma weaning protocol.

## 2025-02-27 NOTE — DISCHARGE PLANNING
Patient Name: Pippa Mcintosh  MRN: [id number]  Receiving Facility: [facility]  Receiving Facility Address: [address] [place] [address]  Accepting Physician: Dr. Lukasz Plummer  Bed Assignment: ER  RN to RN Report Phone #: [phone]  Transport Date: today  Transport Time: ASAP TBD   Transport Vendor: [facility] likely    Transfer packet with signed [employer] form, imaging disc, encounter summary, and provider's note given to ER RN. Felisa ETA 0240.

## 2025-02-27 NOTE — ED NOTES
Update from transfer center: [facility] ER to  ER transfer. Call for report at  [phone]. Accepting MD:  Dr Lukasz Salas. [facility] ETA 0240.
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
## 2025-03-02 LAB — COMPONENT CELLULAR 8504CLL: NORMAL

## (undated) DEVICE — GLOVE BIOGEL INDICATOR SZ 7.5 SURGICAL PF LTX - (50PR/BX 4BX/CA)

## (undated) DEVICE — CANISTER SUCTION 3000ML MECHANICAL FILTER AUTO SHUTOFF MEDI-VAC NONSTERILE LF DISP  (40EA/CA)

## (undated) DEVICE — LACTATED RINGERS INJ 1000 ML - (14EA/CA 60CA/PF)

## (undated) DEVICE — PADDING CAST 4 IN STERILE - 4 X 4 YDS (24/CA)

## (undated) DEVICE — ELECTRODE 850 FOAM ADHESIVE - HYDROGEL RADIOTRNSPRNT (50/PK)

## (undated) DEVICE — MASK ANESTHESIA ADULT  - (100/CA)

## (undated) DEVICE — SUTURE 2-0 PDS II CT-2 - (36/BX)

## (undated) DEVICE — PROTECTOR ULNA NERVE - (36PR/CA)

## (undated) DEVICE — SODIUM CHL. IRRIGATION 0.9% 3000ML (4EA/CA 65CA/PF)

## (undated) DEVICE — SET EXTENSION WITH 2 PORTS (48EA/CA) ***PART #2C8610 IS A SUBSTITUTE*****

## (undated) DEVICE — KIT ANESTHESIA W/CIRCUIT & 3/LT BAG W/FILTER (20EA/CA)

## (undated) DEVICE — HANDPIECE 10FT INTPLS SCT PLS IRRIGATION HAND CONTROL SET (6/PK)

## (undated) DEVICE — GOWN WARMING STANDARD FLEX - (30/CA)

## (undated) DEVICE — WRAP COBAN SELF-ADHERENT 6 IN X  5YDS STERILE TAN (12/CA)

## (undated) DEVICE — PACK LOWER EXTREMITY - (2/CA)

## (undated) DEVICE — SLEEVE, VASO, THIGH, MED

## (undated) DEVICE — STAPLER SKIN DISP - (6/BX 10BX/CA) VISISTAT

## (undated) DEVICE — TIP INTPLS HFLO ML ORFC BTRY - (12/CS)  FOR SURGILAV

## (undated) DEVICE — SODIUM CHL IRRIGATION 0.9% 1000ML (12EA/CA)

## (undated) DEVICE — TUBING CLEARLINK DUO-VENT - C-FLO (48EA/CA)

## (undated) DEVICE — GLOVE BIOGEL PI ORTHO SZ 7.5 PF LF (40PR/BX)

## (undated) DEVICE — SENSOR SPO2 NEO LNCS ADHESIVE (20/BX) SEE USER NOTES

## (undated) DEVICE — PAD LAP STERILE 18 X 18 - (5/PK 40PK/CA)

## (undated) DEVICE — WATER IRRIG. STER. 1500 ML - (9/CA)

## (undated) DEVICE — DRAPE SURG STERI-DRAPE 7X11OD - (40EA/CA)

## (undated) DEVICE — GLOVE BIOGEL SZ 7 SURGICAL PF LTX - (50PR/BX 4BX/CA)

## (undated) DEVICE — SET IRRIGATION CYSTOSCOPY TUBE L80 IN (20EA/CA)

## (undated) DEVICE — ELECTRODE DUAL RETURN W/ CORD - (50/PK)

## (undated) DEVICE — BOVIE BLADE COATED - (50/PK)

## (undated) DEVICE — SUTURE 2-0 VICRYL PLUS CT-1 36 (36PK/BX)"

## (undated) DEVICE — GLOVE BIOGEL PI INDICATOR SZ 7.0 SURGICAL PF LF - (50/BX 4BX/CA)

## (undated) DEVICE — SET LEADWIRE 5 LEAD BEDSIDE DISPOSABLE ECG (1SET OF 5/EA)

## (undated) DEVICE — SUTURE 0 VICRYL PLUS CT-1 - 36 INCH (36/BX)

## (undated) DEVICE — BANDAGE ELASTIC 6 HONEYCOMB - 6X5YD LF (20/CA)"

## (undated) DEVICE — SWAB ANAEROBIC SPEC.COLLECTOR - (25/PK 4PK/CA 100EA/CA)

## (undated) DEVICE — BANDAGE ELASTIC 4 HONEYCOMB - 4"X5YD LF (20/CA)"

## (undated) DEVICE — SUTURE 3-0 ETHILON FS-1 - (36/BX) 30 INCH

## (undated) DEVICE — SUTURE GENERAL

## (undated) DEVICE — HEAD HOLDER JUNIOR/ADULT

## (undated) DEVICE — KIT ROOM DECONTAMINATION

## (undated) DEVICE — TRAY SKIN SCRUB PVP WET (20EA/CA) PART #DYND70356 DISCONTINUED

## (undated) DEVICE — SUCTION INSTRUMENT YANKAUER BULBOUS TIP W/O VENT (50EA/CA)

## (undated) DEVICE — NEPTUNE 4 PORT MANIFOLD - (20/PK)